# Patient Record
Sex: MALE | Race: WHITE | Employment: FULL TIME | URBAN - METROPOLITAN AREA
[De-identification: names, ages, dates, MRNs, and addresses within clinical notes are randomized per-mention and may not be internally consistent; named-entity substitution may affect disease eponyms.]

---

## 2017-03-27 ENCOUNTER — OFFICE VISIT (OUTPATIENT)
Dept: INTERNAL MEDICINE CLINIC | Age: 27
End: 2017-03-27

## 2017-03-27 VITALS
WEIGHT: 171.4 LBS | HEIGHT: 66 IN | BODY MASS INDEX: 27.55 KG/M2 | SYSTOLIC BLOOD PRESSURE: 112 MMHG | OXYGEN SATURATION: 98 % | HEART RATE: 75 BPM | DIASTOLIC BLOOD PRESSURE: 72 MMHG

## 2017-03-27 DIAGNOSIS — K62.5 RECTAL BLEEDING: Primary | ICD-10-CM

## 2017-03-27 DIAGNOSIS — R10.84 GENERALIZED ABDOMINAL PAIN: ICD-10-CM

## 2017-03-27 DIAGNOSIS — Z87.448 HISTORY OF URETHRAL STRICTURE: ICD-10-CM

## 2017-03-27 DIAGNOSIS — Z80.0 FAMILY HISTORY OF COLON CANCER IN MOTHER: ICD-10-CM

## 2017-03-27 LAB
A/G RATIO: 1.4 (ref 1.1–2.2)
ALBUMIN SERPL-MCNC: 4.7 G/DL (ref 3.4–5)
ALP BLD-CCNC: 112 U/L (ref 40–129)
ALT SERPL-CCNC: 12 U/L (ref 10–40)
ANION GAP SERPL CALCULATED.3IONS-SCNC: 19 MMOL/L (ref 3–16)
AST SERPL-CCNC: 22 U/L (ref 15–37)
BASOPHILS ABSOLUTE: 0 K/UL (ref 0–0.2)
BASOPHILS RELATIVE PERCENT: 0.4 %
BILIRUB SERPL-MCNC: 0.4 MG/DL (ref 0–1)
BUN BLDV-MCNC: 12 MG/DL (ref 7–20)
CALCIUM SERPL-MCNC: 9.7 MG/DL (ref 8.3–10.6)
CHLORIDE BLD-SCNC: 101 MMOL/L (ref 99–110)
CO2: 27 MMOL/L (ref 21–32)
CREAT SERPL-MCNC: 0.9 MG/DL (ref 0.9–1.3)
EOSINOPHILS ABSOLUTE: 0.1 K/UL (ref 0–0.6)
EOSINOPHILS RELATIVE PERCENT: 1.6 %
GFR AFRICAN AMERICAN: >60
GFR NON-AFRICAN AMERICAN: >60
GLOBULIN: 3.3 G/DL
GLUCOSE BLD-MCNC: 85 MG/DL (ref 70–99)
HCT VFR BLD CALC: 49.3 % (ref 40.5–52.5)
HEMOCCULT STL QL: ABNORMAL
HEMOCCULT STL QL: ABNORMAL
HEMOCCULT STL QL: POSITIVE
HEMOGLOBIN: 16.5 G/DL (ref 13.5–17.5)
LYMPHOCYTES ABSOLUTE: 1.9 K/UL (ref 1–5.1)
LYMPHOCYTES RELATIVE PERCENT: 24.8 %
MCH RBC QN AUTO: 28.6 PG (ref 26–34)
MCHC RBC AUTO-ENTMCNC: 33.4 G/DL (ref 31–36)
MCV RBC AUTO: 85.6 FL (ref 80–100)
MONOCYTES ABSOLUTE: 0.8 K/UL (ref 0–1.3)
MONOCYTES RELATIVE PERCENT: 10.3 %
NEUTROPHILS ABSOLUTE: 4.9 K/UL (ref 1.7–7.7)
NEUTROPHILS RELATIVE PERCENT: 62.9 %
PDW BLD-RTO: 13.8 % (ref 12.4–15.4)
PLATELET # BLD: 228 K/UL (ref 135–450)
PMV BLD AUTO: 8.5 FL (ref 5–10.5)
POTASSIUM SERPL-SCNC: 4.2 MMOL/L (ref 3.5–5.1)
RBC # BLD: 5.76 M/UL (ref 4.2–5.9)
SODIUM BLD-SCNC: 147 MMOL/L (ref 136–145)
TOTAL PROTEIN: 8 G/DL (ref 6.4–8.2)
TSH SERPL DL<=0.05 MIU/L-ACNC: 2.75 UIU/ML (ref 0.27–4.2)
WBC # BLD: 7.7 K/UL (ref 4–11)

## 2017-03-27 PROCEDURE — 82270 OCCULT BLOOD FECES: CPT | Performed by: INTERNAL MEDICINE

## 2017-03-27 PROCEDURE — 99204 OFFICE O/P NEW MOD 45 MIN: CPT | Performed by: INTERNAL MEDICINE

## 2017-03-27 RX ORDER — PERMETHRIN 50 MG/G
CREAM TOPICAL
Refills: 0 | COMMUNITY
Start: 2017-02-03 | End: 2017-06-03 | Stop reason: ALTCHOICE

## 2017-03-27 ASSESSMENT — ENCOUNTER SYMPTOMS
SHORTNESS OF BREATH: 0
EYE REDNESS: 0
RESPIRATORY NEGATIVE: 1
EYES NEGATIVE: 1
HEARTBURN: 1
SORE THROAT: 0
BACK PAIN: 0
VOMITING: 0
CONSTIPATION: 0
EYE DISCHARGE: 0
SPUTUM PRODUCTION: 0
COUGH: 0
ORTHOPNEA: 0
EYE PAIN: 0
NAUSEA: 0
WHEEZING: 0
DOUBLE VISION: 0
STRIDOR: 0
ABDOMINAL PAIN: 1
DIARRHEA: 0
BLOOD IN STOOL: 1
BLURRED VISION: 0
PHOTOPHOBIA: 0
HEMOPTYSIS: 0

## 2017-03-28 ENCOUNTER — TELEPHONE (OUTPATIENT)
Dept: INTERNAL MEDICINE CLINIC | Age: 27
End: 2017-03-28

## 2017-03-28 LAB
ESTIMATED AVERAGE GLUCOSE: 116.9 MG/DL
HBA1C MFR BLD: 5.7 %

## 2017-04-03 ENCOUNTER — EMPLOYEE WELLNESS (OUTPATIENT)
Dept: OTHER | Age: 27
End: 2017-04-03

## 2017-04-03 LAB
CHOLESTEROL, TOTAL: 139 MG/DL (ref 0–199)
GLUCOSE BLD-MCNC: 76 MG/DL (ref 70–99)
HDLC SERPL-MCNC: 32 MG/DL (ref 40–60)
LDL CHOLESTEROL CALCULATED: 92 MG/DL
TRIGL SERPL-MCNC: 74 MG/DL (ref 0–150)

## 2017-04-04 ENCOUNTER — HOSPITAL ENCOUNTER (OUTPATIENT)
Dept: CT IMAGING | Age: 27
Discharge: HOME | End: 2017-04-04
Attending: INTERNAL MEDICINE | Admitting: INTERNAL MEDICINE

## 2017-04-04 DIAGNOSIS — K62.5 RECTAL BLEEDING: ICD-10-CM

## 2017-04-04 DIAGNOSIS — K62.5 HEMORRHAGE OF ANUS AND RECTUM: ICD-10-CM

## 2017-04-06 ENCOUNTER — TELEPHONE (OUTPATIENT)
Dept: INTERNAL MEDICINE CLINIC | Age: 27
End: 2017-04-06

## 2017-06-14 ENCOUNTER — OFFICE VISIT (OUTPATIENT)
Dept: INTERNAL MEDICINE CLINIC | Age: 27
End: 2017-06-14

## 2017-06-14 VITALS
OXYGEN SATURATION: 97 % | HEIGHT: 66 IN | BODY MASS INDEX: 28.54 KG/M2 | SYSTOLIC BLOOD PRESSURE: 114 MMHG | HEART RATE: 77 BPM | DIASTOLIC BLOOD PRESSURE: 82 MMHG | TEMPERATURE: 97.9 F | WEIGHT: 177.6 LBS

## 2017-06-14 DIAGNOSIS — R51.9 CHRONIC NONINTRACTABLE HEADACHE, UNSPECIFIED HEADACHE TYPE: Primary | ICD-10-CM

## 2017-06-14 DIAGNOSIS — G89.29 CHRONIC NONINTRACTABLE HEADACHE, UNSPECIFIED HEADACHE TYPE: Primary | ICD-10-CM

## 2017-06-14 PROCEDURE — 99213 OFFICE O/P EST LOW 20 MIN: CPT | Performed by: INTERNAL MEDICINE

## 2017-06-14 ASSESSMENT — ENCOUNTER SYMPTOMS
NAUSEA: 0
SORE THROAT: 0
COUGH: 0
EYE REDNESS: 0
EYE DISCHARGE: 0
SPUTUM PRODUCTION: 0
WHEEZING: 0
EYE PAIN: 0
RESPIRATORY NEGATIVE: 1
EYES NEGATIVE: 1
DOUBLE VISION: 0
DIARRHEA: 0
HEMOPTYSIS: 0
VOMITING: 0
ORTHOPNEA: 0
BACK PAIN: 0
PHOTOPHOBIA: 0
HEARTBURN: 1
SHORTNESS OF BREATH: 0
STRIDOR: 0
ABDOMINAL PAIN: 1
CONSTIPATION: 0
BLURRED VISION: 0
BLOOD IN STOOL: 1

## 2017-07-26 ENCOUNTER — TELEPHONE (OUTPATIENT)
Dept: INTERNAL MEDICINE CLINIC | Age: 27
End: 2017-07-26

## 2017-07-27 ENCOUNTER — OFFICE VISIT (OUTPATIENT)
Dept: INTERNAL MEDICINE CLINIC | Age: 27
End: 2017-07-27

## 2017-07-27 VITALS
OXYGEN SATURATION: 98 % | HEART RATE: 74 BPM | SYSTOLIC BLOOD PRESSURE: 124 MMHG | HEIGHT: 66 IN | WEIGHT: 181.4 LBS | DIASTOLIC BLOOD PRESSURE: 82 MMHG | BODY MASS INDEX: 29.15 KG/M2

## 2017-07-27 DIAGNOSIS — A64 SEXUALLY TRANSMITTED DISEASE (STD): ICD-10-CM

## 2017-07-27 DIAGNOSIS — R30.0 DYSURIA: ICD-10-CM

## 2017-07-27 DIAGNOSIS — R30.0 DYSURIA: Primary | ICD-10-CM

## 2017-07-27 DIAGNOSIS — Z87.448 HISTORY OF URETHRAL STRICTURE: ICD-10-CM

## 2017-07-27 LAB
BILIRUBIN URINE: NEGATIVE
BLOOD, URINE: NEGATIVE
CLARITY: CLEAR
COLOR: YELLOW
EPITHELIAL CELLS, UA: 0 /HPF (ref 0–5)
GLUCOSE URINE: NEGATIVE MG/DL
HYALINE CASTS: 0 /LPF (ref 0–8)
KETONES, URINE: NEGATIVE MG/DL
LEUKOCYTE ESTERASE, URINE: ABNORMAL
MICROSCOPIC EXAMINATION: YES
NITRITE, URINE: NEGATIVE
PH UA: 6
PROTEIN UA: NEGATIVE MG/DL
RBC UA: 3 /HPF (ref 0–4)
SPECIFIC GRAVITY UA: 1.01
URINE TYPE: ABNORMAL
UROBILINOGEN, URINE: 0.2 E.U./DL
WBC UA: 3 /HPF (ref 0–5)

## 2017-07-27 PROCEDURE — 96372 THER/PROPH/DIAG INJ SC/IM: CPT | Performed by: INTERNAL MEDICINE

## 2017-07-27 PROCEDURE — 99213 OFFICE O/P EST LOW 20 MIN: CPT | Performed by: INTERNAL MEDICINE

## 2017-07-27 RX ORDER — AZITHROMYCIN 500 MG/1
1000 TABLET, FILM COATED ORAL ONCE
Qty: 2 TABLET | Refills: 0 | Status: SHIPPED | OUTPATIENT
Start: 2017-07-27 | End: 2017-07-27

## 2017-07-27 RX ORDER — CEFTRIAXONE SODIUM 250 MG/1
250 INJECTION, POWDER, FOR SOLUTION INTRAMUSCULAR; INTRAVENOUS ONCE
Status: COMPLETED | OUTPATIENT
Start: 2017-07-27 | End: 2017-07-27

## 2017-07-27 RX ADMIN — CEFTRIAXONE SODIUM 500 MG: 250 INJECTION, POWDER, FOR SOLUTION INTRAMUSCULAR; INTRAVENOUS at 17:56

## 2017-07-27 ASSESSMENT — ENCOUNTER SYMPTOMS
BLOOD IN STOOL: 1
ORTHOPNEA: 0
VOMITING: 0
EYE PAIN: 0
BLURRED VISION: 0
PHOTOPHOBIA: 0
EYES NEGATIVE: 1
SORE THROAT: 0
DIARRHEA: 0
STRIDOR: 0
DOUBLE VISION: 0
ABDOMINAL PAIN: 0
HEMOPTYSIS: 0
WHEEZING: 0
RESPIRATORY NEGATIVE: 1
HEARTBURN: 0
BACK PAIN: 0
NAUSEA: 0
EYE DISCHARGE: 0
EYE REDNESS: 0
CONSTIPATION: 0
SHORTNESS OF BREATH: 0
COUGH: 0
SPUTUM PRODUCTION: 0

## 2017-07-28 LAB
C. TRACHOMATIS DNA ,URINE: NEGATIVE
N. GONORRHOEAE DNA, URINE: POSITIVE

## 2017-07-29 LAB — URINE CULTURE, ROUTINE: NORMAL

## 2017-09-19 ENCOUNTER — SURG/PROC ORDERS (OUTPATIENT)
Dept: ANESTHESIOLOGY | Age: 27
End: 2017-09-19

## 2017-09-19 RX ORDER — SODIUM CHLORIDE 0.9 % (FLUSH) 0.9 %
10 SYRINGE (ML) INJECTION EVERY 12 HOURS SCHEDULED
Status: CANCELLED | OUTPATIENT
Start: 2017-09-19

## 2017-09-19 RX ORDER — SODIUM CHLORIDE 9 MG/ML
INJECTION, SOLUTION INTRAVENOUS CONTINUOUS
Status: CANCELLED | OUTPATIENT
Start: 2017-09-19

## 2017-09-19 RX ORDER — SODIUM CHLORIDE 0.9 % (FLUSH) 0.9 %
10 SYRINGE (ML) INJECTION PRN
Status: CANCELLED | OUTPATIENT
Start: 2017-09-19

## 2017-09-21 ENCOUNTER — HOSPITAL ENCOUNTER (OUTPATIENT)
Dept: ENDOSCOPY | Age: 27
Discharge: HOME | End: 2017-09-21
Attending: INTERNAL MEDICINE | Admitting: INTERNAL MEDICINE

## 2017-09-21 VITALS
HEART RATE: 70 BPM | DIASTOLIC BLOOD PRESSURE: 79 MMHG | SYSTOLIC BLOOD PRESSURE: 122 MMHG | RESPIRATION RATE: 16 BRPM | OXYGEN SATURATION: 98 % | BODY MASS INDEX: 28.61 KG/M2 | TEMPERATURE: 97.4 F | WEIGHT: 178 LBS | HEIGHT: 66 IN

## 2017-09-21 RX ORDER — SODIUM CHLORIDE 9 MG/ML
INJECTION, SOLUTION INTRAVENOUS CONTINUOUS
Status: DISCONTINUED | OUTPATIENT
Start: 2017-09-21 | End: 2017-09-22 | Stop reason: HOSPADM

## 2017-09-21 RX ORDER — SODIUM CHLORIDE 0.9 % (FLUSH) 0.9 %
10 SYRINGE (ML) INJECTION PRN
Status: DISCONTINUED | OUTPATIENT
Start: 2017-09-21 | End: 2017-09-22 | Stop reason: HOSPADM

## 2017-09-21 RX ORDER — SODIUM CHLORIDE 0.9 % (FLUSH) 0.9 %
10 SYRINGE (ML) INJECTION EVERY 12 HOURS SCHEDULED
Status: DISCONTINUED | OUTPATIENT
Start: 2017-09-21 | End: 2017-09-22 | Stop reason: HOSPADM

## 2017-09-21 RX ADMIN — SODIUM CHLORIDE: 9 INJECTION, SOLUTION INTRAVENOUS at 08:33

## 2017-09-21 ASSESSMENT — PAIN SCALES - GENERAL
PAINLEVEL_OUTOF10: 0

## 2017-09-21 ASSESSMENT — PAIN - FUNCTIONAL ASSESSMENT: PAIN_FUNCTIONAL_ASSESSMENT: 0-10

## 2017-10-19 ENCOUNTER — OFFICE VISIT (OUTPATIENT)
Dept: INTERNAL MEDICINE CLINIC | Age: 27
End: 2017-10-19

## 2017-10-19 VITALS
BODY MASS INDEX: 29.83 KG/M2 | TEMPERATURE: 98.6 F | SYSTOLIC BLOOD PRESSURE: 134 MMHG | RESPIRATION RATE: 16 BRPM | OXYGEN SATURATION: 98 % | HEART RATE: 76 BPM | WEIGHT: 184.8 LBS | DIASTOLIC BLOOD PRESSURE: 78 MMHG

## 2017-10-19 DIAGNOSIS — Z23 NEED FOR DIPHTHERIA-TETANUS-PERTUSSIS (TDAP) VACCINE: ICD-10-CM

## 2017-10-19 DIAGNOSIS — M23.8X1 KNEE JOINT LAXITY, RIGHT: Primary | ICD-10-CM

## 2017-10-19 DIAGNOSIS — R29.6 FALLS FREQUENTLY: ICD-10-CM

## 2017-10-19 DIAGNOSIS — M25.561 ACUTE PAIN OF RIGHT KNEE: ICD-10-CM

## 2017-10-19 PROCEDURE — 90471 IMMUNIZATION ADMIN: CPT | Performed by: INTERNAL MEDICINE

## 2017-10-19 PROCEDURE — 90715 TDAP VACCINE 7 YRS/> IM: CPT | Performed by: INTERNAL MEDICINE

## 2017-10-19 PROCEDURE — 99214 OFFICE O/P EST MOD 30 MIN: CPT | Performed by: INTERNAL MEDICINE

## 2017-10-19 ASSESSMENT — ENCOUNTER SYMPTOMS
EYES NEGATIVE: 1
RESPIRATORY NEGATIVE: 1
GASTROINTESTINAL NEGATIVE: 1

## 2017-10-19 ASSESSMENT — PATIENT HEALTH QUESTIONNAIRE - PHQ9
2. FEELING DOWN, DEPRESSED OR HOPELESS: 0
1. LITTLE INTEREST OR PLEASURE IN DOING THINGS: 0
SUM OF ALL RESPONSES TO PHQ9 QUESTIONS 1 & 2: 0
SUM OF ALL RESPONSES TO PHQ QUESTIONS 1-9: 0

## 2017-10-19 NOTE — PROGRESS NOTES
lethargic, to not be writhing in pain, not malnourished, not dehydrated and not jaundiced. He appears healthy. He appears not cachectic. Non-toxic appearance. He does not have a sickly appearance. No distress. Eyes: Right eye visual fields normal and left eye visual fields normal. Conjunctivae, EOM and lids are normal. Pupils are equal, round, and reactive to light. No scleral icterus. Neck: Normal range of motion. Neck supple. No JVD present. No tracheal deviation present. No thyromegaly present. Cardiovascular: Normal rate, regular rhythm, normal heart sounds and intact distal pulses. Exam reveals no gallop and no friction rub. No murmur heard. Pulmonary/Chest: Effort normal and breath sounds normal. No stridor. No respiratory distress. He has no wheezes. He has no rales. He exhibits no tenderness. Musculoskeletal:        Right knee: He exhibits decreased range of motion, abnormal meniscus and MCL laxity. He exhibits no swelling, no effusion, no ecchymosis, no deformity, no laceration, no erythema and normal alignment. Left knee: Normal.   Right ACL laxity   Lymphadenopathy:     He has no cervical adenopathy. Neurological: He is oriented to person, place, and time. He appears not lethargic. Skin: He is not diaphoretic. Assessment/Plan:      Encounter Diagnoses   Name Primary?  Knee joint laxity, right Yes    Acute pain of right knee     Falls frequently     Need for diphtheria-tetanus-pertussis (Tdap) vaccine        1. Knee joint laxity, right  Possibly with an ACL tear from work or volleyball with weakness, laxity, falls  - MRI Knee Right W JT WO Contrast; Future    2. Acute pain of right knee  Ok for NSAIDs for now  - MRI Knee Right W JT WO Contrast; Future    3. Falls frequently  Likely has a connective tissue tear in his right knee, will wear support sleeve and get MRI  - MRI Knee Right W JT WO Contrast; Future    4.  Need for diphtheria-tetanus-pertussis (Tdap) vaccine  Today  - Tdap (age 10y-63y) IM (ADACEL)        Additional Orders:      Orders Placed This Encounter   Procedures    MRI Knee Right W JT WO Contrast     Standing Status:   Future     Standing Expiration Date:   10/19/2018     Order Specific Question:   Reason for exam:     Answer:   Laxity, falls frequently, concern for ACL tear    Tdap (age 10y-63y) IM (ADACEL)     No orders of the defined types were placed in this encounter. DISPOSITION:      Return in about 6 weeks (around 11/30/2017), or if symptoms worsen or fail to improve.   1. Greater than 25 minutes spent with patient and >20 minutes on medication dosing, use and lifestyle modifications, home safety    Luz Marina Rodriguez MD

## 2017-10-19 NOTE — PATIENT INSTRUCTIONS
Patient Education        Anterior Cruciate Ligament (ACL) Tear: Care Instructions  Your Care Instructions    The anterior cruciate ligament (ACL) is one of four knee ligaments that connect the upper leg bone with the lower leg bones. The ACL keeps your knee stable when your leg moves forward and backward. It also keeps the knee from bending sideways. A torn ACL often occurs during sports that require stop-and-go or twisting motions, such as soccer, football, basketball, and snow skiing. Treatment depends on how badly the ACL is torn. Not all injuries need surgery. Your doctor also will base your treatment on how unstable the knee is, whether other parts of your knee are injured, and how active you are. You may be able to wait for a while before deciding whether to have surgery. Follow-up care is a key part of your treatment and safety. Be sure to make and go to all appointments, and call your doctor if you are having problems. It's also a good idea to know your test results and keep a list of the medicines you take. How can you care for yourself at home? · Follow your doctor's directions for wearing a brace or an immobilizer, which limits movement of your knee. Wrapping your knee with an elastic bandage may help reduce or prevent swelling. · Use crutches as directed in the first few days after your injury if your doctor recommends them. · Rest your knee when possible. But try to flex your calf muscles often to help blood circulate in your legs. · Put ice or a cold pack on your knee for 10 to 20 minutes at a time. Try to do this every 1 to 2 hours during the next 3 days (when you are awake) or until the swelling goes down. Put a thin cloth between the ice and your skin. · Prop up your leg on a pillow when you ice it or anytime you sit or lie down during the next 3 days. Try to keep it above the level of your heart. This will help reduce swelling. · Take pain medicines exactly as directed.   ¨ If the doctor gave you a prescription medicine for pain, take it as prescribed. ¨ If you are not taking a prescription pain medicine, take an over-the-counter medicine to reduce pain and swelling. These include ibuprofen (Advil, Motrin) and naproxen (Aleve). Read and follow all instructions on the label. · Follow your doctor's or physical therapist's directions for strength exercises. Exercises to make your thigh muscles stronger and increase knee motion can help you get ready for a physical rehabilitation (rehab) program or surgery with a rehab program. Here are a few exercises you can do if your doctor says it is okay. ¨ Quad sets: Lie down on the floor or the bed with your injured leg straight. Fully extend your legthere should be no or little bend in your knee. Tighten the thigh (quadriceps) of your injured leg for 10 seconds. Do not lift your heel up. Relax your quadriceps for 10 seconds. Repeat 8 to 12 times several times during the day. ¨ Straight-leg raises: Lie down on the floor or the bed with your injured leg flat and your uninjured leg bent so that the bottom of your foot is on the floor or bed. Tighten the quadriceps of your injured leg. Keeping your knee as straight as possible, lift your injured leg off the bed until it is about 18 inches above the bed or floor. Lower your leg back down and relax for 5 seconds. Do 3 sets of 8 to 12 repetitions. ¨ Heel slides: Lie down on the floor or the bed with your leg flat. Slowly begin to slide your heel toward your rear end (buttocks), keeping your heel on the floor. Your knee will begin to bend. Slide your heel and bend your knee until it becomes a little sore and you can feel a small amount of pressure inside your knee. Hold this position for 15 to 30 seconds. Slide your heel back down until your leg is straight on the floor. Relax for 10 seconds. Repeat 2 to 4 times several times during the day.   ¨ Side-lying leg lifts: Lie on your side with your legs straight and the injured leg on top. Keeping your leg straight, raise your injured leg up and backward about 6 inches. Lower the leg to the starting position. Do 3 sets of 20 repetitions, or if you tire quickly, 3 sets of 8 to 12 repetitions. When should you call for help? Call 911 anytime you think you may need emergency care. For example, call if:  · You have sudden chest pain and shortness of breath, and you cough up blood. Call your doctor now or seek immediate medical care if:  · You have signs of a blood clot, such as:  ¨ Pain in your calf, back of the knee, thigh, or groin. ¨ Redness and swelling in your leg or groin. · You have increasing or severe knee pain. Watch closely for changes in your health, and be sure to contact your doctor if:  · Your knee begins to lock up or give out. · You are having more trouble putting weight on your knee. · You do not get better as expected. Where can you learn more? Go to [email or web address]://[email or web address] [email or web address].[email or web address]. org and sign in to your [employer] account. Tracy Blancas in the [facility] box to learn more about \"Anterior Cruciate Ligament (ACL) Tear: Care Instructions. \"     If you do not have an account, please click on the \"Sign Up Now\" link. Current as of: March 21, 2017  Content Version: 11.3  © 2206-1980 [employer]. Care instructions adapted under license by [facility] ([facility]). If you have questions about a medical condition or this instruction, always ask your healthcare professional. [place] [address] any warranty or liability for your use of this information. Patient Education        Anterior Cruciate Ligament (ACL): Rehab Exercises  Your Care Instructions  Here are some examples of exercises for your ACL. Start each exercise slowly. Ease off the exercise if you start to have pain. Your doctor or physical therapist will tell you when you can start these exercises and which ones will work best for you.   How to do the exercises  Quad off the floor until your shoulders, hips, and knees are all in a straight line. 3. Hold for about 6 seconds as you continue to breathe normally, and then slowly lower your hips back down to the floor and rest for up to 10 seconds. 4. Do 8 to 12 repetitions. Hamstring curls    1. Lie on your stomach with your knees straight. If your kneecap is uncomfortable, roll up a washcloth and put it under your leg just above your kneecap. 2. Lift the foot of your injured leg by bending the knee so that you bring the foot up toward your buttock. If this motion hurts, try it without bending your knee quite as far. This may help you avoid any painful motion. 3. Slowly lower your leg back to the floor. 4. Do 8 to 12 repetitions. 5. With permission from your doctor or physical therapist, you may also want to add a cuff weight to your ankle (not more than 5 pounds) or use soup cans in a plastic bag with the loops around your ankle. With weight, you do not have to lift your leg more than 12 inches to get a hamstring workout. Shallow standing knee bends    Note: Do this exercise only if you have very little pain; if you have no clicking, locking, or giving way in the injured knee; and if it does not hurt while you are doing 8 to 12 repetitions. 1. Stand with your hands lightly resting on a counter or chair in front of you. Put your feet shoulder-width apart. 2. Slowly bend your knees so that you squat down like you are going to sit in a chair. Make sure that your knees do not go in front of your toes. 3. Lower yourself about 6 inches. Your heels should remain on the floor at all times. 4. Rise slowly to a standing position. Heel raises    1. Stand with your feet a few inches apart, with your hands lightly resting on a counter or chair in front of you. 2. Slowly raise your heels off the floor while keeping your knees straight. 3. Hold for about 6 seconds, then slowly lower your heels to the floor.   4. Do 8 to 12

## 2017-11-01 ENCOUNTER — OFFICE VISIT (OUTPATIENT)
Dept: SURGERY | Age: 27
End: 2017-11-01

## 2017-11-01 ENCOUNTER — HOSPITAL ENCOUNTER (OUTPATIENT)
Dept: MRI IMAGING | Age: 27
Discharge: HOME | End: 2017-11-01
Attending: INTERNAL MEDICINE | Admitting: INTERNAL MEDICINE

## 2017-11-01 VITALS
DIASTOLIC BLOOD PRESSURE: 76 MMHG | SYSTOLIC BLOOD PRESSURE: 110 MMHG | HEIGHT: 66 IN | BODY MASS INDEX: 29.73 KG/M2 | WEIGHT: 185 LBS

## 2017-11-01 DIAGNOSIS — M23.8X1 OTHER INTERNAL DERANGEMENTS OF RIGHT KNEE: ICD-10-CM

## 2017-11-01 DIAGNOSIS — M23.8X1 KNEE JOINT LAXITY, RIGHT: ICD-10-CM

## 2017-11-01 DIAGNOSIS — K64.9 HEMORRHOIDS, UNSPECIFIED HEMORRHOID TYPE: Primary | ICD-10-CM

## 2017-11-01 DIAGNOSIS — M25.561 ACUTE PAIN OF RIGHT KNEE: ICD-10-CM

## 2017-11-01 DIAGNOSIS — R29.6 FALLS FREQUENTLY: ICD-10-CM

## 2017-11-01 PROCEDURE — 99243 OFF/OP CNSLTJ NEW/EST LOW 30: CPT | Performed by: SURGERY

## 2017-12-12 ENCOUNTER — OFFICE VISIT (OUTPATIENT)
Dept: INTERNAL MEDICINE CLINIC | Age: 27
End: 2017-12-12

## 2017-12-12 VITALS
SYSTOLIC BLOOD PRESSURE: 98 MMHG | WEIGHT: 181.4 LBS | HEIGHT: 66 IN | BODY MASS INDEX: 29.15 KG/M2 | DIASTOLIC BLOOD PRESSURE: 70 MMHG | HEART RATE: 70 BPM | TEMPERATURE: 97.9 F | OXYGEN SATURATION: 95 %

## 2017-12-12 DIAGNOSIS — Z02.0 SCHOOL PHYSICAL EXAM: ICD-10-CM

## 2017-12-12 DIAGNOSIS — Z02.0 SCHOOL PHYSICAL EXAM: Primary | ICD-10-CM

## 2017-12-12 DIAGNOSIS — D23.9 DYSPLASTIC NEVUS OF SKIN: ICD-10-CM

## 2017-12-12 LAB
HBV SURFACE AB TITR SER: 6.65 MUL/ML
RUBELLA ANTIBODY IGG: 45.3 IU/ML

## 2017-12-12 PROCEDURE — 86580 TB INTRADERMAL TEST: CPT | Performed by: INTERNAL MEDICINE

## 2017-12-12 PROCEDURE — 99395 PREV VISIT EST AGE 18-39: CPT | Performed by: INTERNAL MEDICINE

## 2017-12-12 NOTE — PROGRESS NOTES
OUTPATIENT PROGRESS NOTE  Date of Service:  12/12/2017  Address: [address] [place] [address] INTERNAL MEDICINE  [address] [address]  Dept: [phone]    Subjective:      Chief Complaint   Patient presents with    Annual Exam      Patient ID: [id number]  Kevon Maza is a 32 y.o. male    HPIwho is here for a school physcial for masters in nursing at [employer]. He has no complaints. He works a lot and goes to school no formal exercise plan. Allergies   Allergen Reactions    Amoxicillin     Dye [Iodides]      Current Outpatient Prescriptions   Medication Sig Dispense Refill    pantoprazole (PROTONIX) 20 MG tablet Take 1 tablet by mouth daily. 30 tablet 0     No current facility-administered medications for this visit. Past Medical History:   Diagnosis Date    GERD (gastroesophageal reflux disease)     Migraine     Myoclonic disorder     pt reports last episode 6 months ago     Past Surgical History:   Procedure Laterality Date    COLONOSCOPY      TYMPANOSTOMY TUBE PLACEMENT Left 1992     Social History   Substance Use Topics    Smoking status: Never Smoker    Smokeless tobacco: Never Used    Alcohol use 3.6 oz/week     6 Cans of beer per week      Comment: occassionally     Family History   Problem Relation Age of Onset    Colon Cancer Mother 48    Diabetes Father     Depression Father           Review of Systems   Neurological:        Right knee used to give out unexplained but stopped   All other systems reviewed and are negative. Objective:   Physical Exam   Constitutional: He is well-developed, well-nourished, and in no distress. HENT:   Head: Normocephalic and atraumatic. Right Ear: External ear normal.   Left Ear: External ear normal.   Nose: Nose normal.   Mouth/Throat: Oropharynx is clear and moist.   Eyes: Conjunctivae and EOM are normal. Pupils are equal, round, and reactive to light. Right eye exhibits no discharge.  Left eye exhibits no discharge. No scleral icterus. Neck: Normal range of motion. Neck supple. No JVD present. No tracheal deviation present. No thyromegaly present. Cardiovascular: Normal rate, regular rhythm, normal heart sounds and intact distal pulses. Exam reveals no gallop and no friction rub. No murmur heard. Pulmonary/Chest: Effort normal and breath sounds normal. No stridor. No respiratory distress. He has no wheezes. He has no rales. He exhibits no tenderness. Abdominal: Soft. Bowel sounds are normal. There is no tenderness. Musculoskeletal: Normal range of motion. He exhibits no edema or tenderness. Lymphadenopathy:     He has no cervical adenopathy. Neurological: He is alert. He has normal reflexes. No cranial nerve deficit. He exhibits normal muscle tone. Gait normal.   Skin: Skin is warm and dry. No rash noted. Unusual mole with multiple dark spots and fairly large diam, smooth border on back   Psychiatric: Mood, memory, affect and judgment normal.   Nursing note and vitals reviewed. Assessment/Plan   1. School physical exam  Due for PPD and Hep B SAb  Form completesd       2.  Mole unusual : will refer for skin Nancy Yuen MD

## 2017-12-14 ENCOUNTER — NURSE ONLY (OUTPATIENT)
Dept: INTERNAL MEDICINE CLINIC | Age: 27
End: 2017-12-14

## 2017-12-14 LAB
INDURATION: 0
MUV IGG SER QL: 98.1 AU/ML
RUBEOLA (MEASLES) AB IGG: 125 AU/ML
TB SKIN TEST: NEGATIVE

## 2018-03-20 VITALS — WEIGHT: 168 LBS | BODY MASS INDEX: 27.12 KG/M2

## 2018-09-14 ENCOUNTER — OFFICE VISIT (OUTPATIENT)
Dept: INTERNAL MEDICINE CLINIC | Age: 28
End: 2018-09-14

## 2018-09-14 VITALS
BODY MASS INDEX: 31.08 KG/M2 | SYSTOLIC BLOOD PRESSURE: 114 MMHG | HEART RATE: 88 BPM | DIASTOLIC BLOOD PRESSURE: 82 MMHG | WEIGHT: 193.4 LBS | OXYGEN SATURATION: 98 % | HEIGHT: 66 IN

## 2018-09-14 DIAGNOSIS — R36.9 PENILE DISCHARGE: Primary | ICD-10-CM

## 2018-09-14 DIAGNOSIS — Z87.448 HISTORY OF URETHRAL STRICTURE: ICD-10-CM

## 2018-09-14 DIAGNOSIS — Z20.2 EXPOSURE TO STD: ICD-10-CM

## 2018-09-14 LAB
BILIRUBIN, POC: NORMAL
BLOOD URINE, POC: NORMAL
CLARITY, POC: NORMAL
COLOR, POC: YELLOW
GLUCOSE URINE, POC: NORMAL
KETONES, POC: NORMAL
LEUKOCYTE EST, POC: NORMAL
NITRITE, POC: NORMAL
PH, POC: 7
PROTEIN, POC: NORMAL
SPECIFIC GRAVITY, POC: 1.02
UROBILINOGEN, POC: 0.2

## 2018-09-14 PROCEDURE — 81002 URINALYSIS NONAUTO W/O SCOPE: CPT | Performed by: NURSE PRACTITIONER

## 2018-09-14 PROCEDURE — 96372 THER/PROPH/DIAG INJ SC/IM: CPT | Performed by: NURSE PRACTITIONER

## 2018-09-14 PROCEDURE — 99213 OFFICE O/P EST LOW 20 MIN: CPT | Performed by: NURSE PRACTITIONER

## 2018-09-14 RX ORDER — AZITHROMYCIN 500 MG/1
TABLET, FILM COATED ORAL
Qty: 2 TABLET | Refills: 0 | Status: SHIPPED | OUTPATIENT
Start: 2018-09-14 | End: 2019-01-19

## 2018-09-14 NOTE — PROGRESS NOTES
HENT:   Head: Normocephalic and atraumatic. Eyes: Pupils are equal, round, and reactive to light. Conjunctivae are normal.   Neck: Normal range of motion. Neck supple. Cardiovascular: Normal rate, regular rhythm, normal heart sounds and intact distal pulses. Exam reveals no gallop and no friction rub. No murmur heard. Pulmonary/Chest: Effort normal and breath sounds normal. No respiratory distress. He has no wheezes. He has no rales. Genitourinary: Penis normal.   Musculoskeletal: Normal range of motion. Neurological: He is alert and oriented to person, place, and time. Skin: Skin is warm and dry. No rash noted. He is not diaphoretic. Psychiatric: He has a normal mood and affect. His behavior is normal. Judgment and thought content normal.   Nursing note and vitals reviewed. Assessment/Plan     Berkley Saravia was seen today for other. Diagnoses and all orders for this visit:    Penile discharge  -     cefTRIAXone (ROCEPHIN) 250 mg in lidocaine 1 % 0.71 mL IM Injection; Inject 0.71 mLs into the muscle once  -     azithromycin (ZITHROMAX) 500 MG tablet; Take 2 tabs oral route daily x1 dose  -     C.trachomatis N.gonorrhoeae DNA, Urine; Future  -     C.trachomatis N.gonorrhoeae DNA, Urine  -     POCT Urinalysis no Micro    History of urethral stricture  -Will treat symptoms as probable STD    Exposure to STD  See above        Past, Family, and Social history reviewed with patient during this visit. RX management: see under diagnosis section    Orders Placed This Encounter   Procedures    C.trachomatis N.gonorrhoeae DNA, Urine     Standing Status:   Future     Number of Occurrences:   1     Standing Expiration Date:   9/14/2019    POCT Urinalysis no Micro       Return if symptoms worsen or fail to improve.       KALLI Sosa     9/17/2018  9:49 AM

## 2018-09-15 LAB — TOTAL SYPHILLIS IGG/IGM: NORMAL

## 2018-09-17 LAB
C. TRACHOMATIS DNA ,URINE: NEGATIVE
N. GONORRHOEAE DNA, URINE: POSITIVE

## 2018-09-17 ASSESSMENT — ENCOUNTER SYMPTOMS
DIARRHEA: 0
COUGH: 0
NAUSEA: 0
SHORTNESS OF BREATH: 0
VOMITING: 0
ABDOMINAL PAIN: 0

## 2018-11-30 ENCOUNTER — HOSPITAL ENCOUNTER (OUTPATIENT)
Dept: ULTRASOUND IMAGING | Age: 28
Discharge: HOME | End: 2018-11-30
Payer: COMMERCIAL

## 2018-11-30 DIAGNOSIS — N50.812 TESTICULAR PAIN, LEFT: ICD-10-CM

## 2018-11-30 PROCEDURE — 76870 US EXAM SCROTUM: CPT

## 2019-04-24 NOTE — PROGRESS NOTES
Jermaine Hopkins Patient Age: 52 year old  MESSAGE:   Received order from Yobani Sherwood CNP to see patient for renal cyst. Please see MRI from 4/2/19: \"9 mm complex cyst noted anteriorly at the lower pole of the left kidney.\"    Spoke to patient and scheduled him for 5/3/19 at 1120.     Next and Last Visit with Provider and Department  Last visit with this provider: Visit date not found  Last visit with this department: Visit date not found    Next visit with this provider: Visit date not found  Next visit with this department: Visit date not found    WEIGHT AND HEIGHT:   Wt Readings from Last 1 Encounters:   04/24/19 97.5 kg (215 lb)     Ht Readings from Last 1 Encounters:   03/14/19 6' (1.829 m)     BMI Readings from Last 1 Encounters:   04/24/19 29.16 kg/m²       ALLERGIES:  Penicillins  Current Outpatient Medications   Medication   • triamcinolone (ARISTOSPAN) 0.1 % lotion   • pantoprazole (PROTONIX) 40 MG tablet     No current facility-administered medications for this visit.      PHARMACY to use:          Pharmacy preference(s) on file:   [id number] - [place], [place] - [address] AT [facility]  [address] [facility] [address]  Phone: [phone] Fax: [phone]      CALL BACK INFO: DO NOT LEAVE A MESSAGE - contact patient directly  ROUTING: Patient's physician/staff        PCP: Kathryn Steiner MD         INS: Payor: [employer] - [employer] / Plan: [employer] - [employer] / Product Type: Dreyer Risk   PATIENT ADDRESS:  [address] [place] [address] [place] [address]    banding so will defer to that time. EXT/NEURO: normal gait, strength/sensation grossly intact in all extremities      Assessment:     Internal hemorrhoids with bleeding     Plan:     Discussed RBA of various treatment options. At this point I recommend the patient supplement the diet with some fiber and stay hydrated. Offered hemorrhoid banding which he would like to check to see if insurance will cover prior to scheduling. I went through our handout \"The Hemorrhoid Book\" with the patient and showed pictures of the various procedures. Diet and toilet habits discussed. I don't think the symptoms are severe enough to warrant excision. He will do some research and contact his insurance company and let us know.      Belén Higuera M.D.  11/1/17   10:02 AM

## 2019-05-13 ENCOUNTER — EMPLOYEE WELLNESS (OUTPATIENT)
Dept: OTHER | Age: 29
End: 2019-05-13

## 2019-05-13 LAB
CHOLESTEROL, TOTAL: 126 MG/DL (ref 0–199)
GLUCOSE BLD-MCNC: 81 MG/DL (ref 70–99)
HDLC SERPL-MCNC: 29 MG/DL (ref 40–60)
LDL CHOLESTEROL CALCULATED: 76 MG/DL
TRIGL SERPL-MCNC: 103 MG/DL (ref 0–150)

## 2019-05-28 VITALS — WEIGHT: 192 LBS | BODY MASS INDEX: 30.99 KG/M2

## 2020-02-19 ENCOUNTER — TELEPHONE (OUTPATIENT)
Dept: ORTHOPEDIC SURGERY | Age: 30
End: 2020-02-19

## 2020-02-19 ENCOUNTER — OFFICE VISIT (OUTPATIENT)
Dept: ORTHOPEDIC SURGERY | Age: 30
End: 2020-02-19
Payer: COMMERCIAL

## 2020-02-19 VITALS — HEIGHT: 66 IN | WEIGHT: 197.09 LBS | BODY MASS INDEX: 31.68 KG/M2

## 2020-02-19 PROCEDURE — 99203 OFFICE O/P NEW LOW 30 MIN: CPT | Performed by: FAMILY MEDICINE

## 2020-02-19 PROCEDURE — L4361 PNEUMA/VAC WALK BOOT PRE OTS: HCPCS | Performed by: FAMILY MEDICINE

## 2020-02-19 PROCEDURE — L3040 FT ARCH SUPRT PREMOLD LONGIT: HCPCS | Performed by: FAMILY MEDICINE

## 2020-02-19 RX ORDER — METHYLPREDNISOLONE 4 MG/1
TABLET ORAL
Qty: 21 KIT | Refills: 0 | Status: SHIPPED | OUTPATIENT
Start: 2020-02-19 | End: 2021-01-26 | Stop reason: CLARIF

## 2020-02-19 RX ORDER — ETODOLAC 400 MG/1
400 TABLET, FILM COATED ORAL 2 TIMES DAILY
Qty: 60 TABLET | Refills: 3 | Status: SHIPPED | OUTPATIENT
Start: 2020-02-19 | End: 2021-01-26 | Stop reason: CLARIF

## 2020-02-19 NOTE — PROGRESS NOTES
systems reviewed from Patient History Form dated on 2/19/2020 and available in the patient's chart under the Media tab. Vital Signs     Ht 5' 5.98\" (1.676 m)   Wt 197 lb 1.5 oz (89.4 kg)   BMI 31.83 kg/m²     Past Medical History   has a past medical history of GERD (gastroesophageal reflux disease), Migraine, and Myoclonic disorder. Past Surgical History   has a past surgical history that includes Tympanostomy tube placement (Left, 1992) and Colonoscopy. Social History     Tobacco Use    Smoking status: Never Smoker    Smokeless tobacco: Never Used   Substance Use Topics    Alcohol use: Yes     Alcohol/week: 6.0 standard drinks     Types: 6 Cans of beer per week     Comment: occassionally    Drug use: No        Current Outpatient Medications   Medication Sig Dispense Refill    methylPREDNISolone (MEDROL DOSEPACK) 4 MG tablet Take by mouth as directed. 21 kit 0    etodolac (LODINE) 400 MG tablet Take 1 tablet by mouth 2 times daily 60 tablet 3     No current facility-administered medications for this visit. General Exam:   Constitutional: Patient is adequately groomed with no evidence of malnutrition  DTRs: Deep tendon reflexes are intact  Mental Status: The patient is oriented to time, place and person. The patient's mood and affect are appropriate. Lymphatic: The lymphatic examination bilaterally reveals all areas to be without enlargement or induration. Vascular: Examination reveals no swelling or calf tenderness. Peripheral pulses are palpable and 2+. Neurological: The patient has good coordination. There is no weakness or sensory deficit. Right foot examination    Inspection: There is no high-grade deformity soft tissue swelling or palpable masses. Palpation: Clinically tender over the peroneal brevis and mildly over the base of the fifth metatarsal but there is no bony step-offs or soft tissue swelling.   No medial or lateral ankle ligamentous or additional midfoot tendonitis of right lower extremity     Pes planus of both feet        Orders Placed This Encounter   Procedures    XR FOOT RIGHT (MIN 3 VIEWS)     Standing Status:   Future     Number of Occurrences:   1     Standing Expiration Date:   2/19/2021    Ambulatory referral to Physical Therapy     Referral Priority:   Routine     Referral Type:   Eval and Treat     Referral Reason:   Specialty Services Required     Requested Specialty:   Physical Therapy     Number of Visits Requested:   1    OTS FP Pneumatic Walking Boot Tall DJO     Patient was prescribed a [place] Shawn Tall Walking Boot. The right foot will require stabilization / immobilization from this semi-rigid / rigid orthosis to improve their function. The orthosis will assist in protecting the affected area, provide functional support and facilitate healing. Patient was instructed to progress ambulation weight bearing as tolerated in the device. The patient was educated and fit by a healthcare professional with expert knowledge and specialization in brace application while under the direct supervision of the physician. Verbal and written instructions for the use of and application of this item were provided. They were instructed to contact the office immediately should the brace result in increased pain, decreased sensation, increased swelling or worsening of the condition.  Powerstep Protech Full Length Insert     Patient was prescribed Powerstep Protech Full Length Inserts. The bilateral feet will require stabilization / support from this semi-rigid / rigid orthosis to improve their function. The orthosis will assist in protecting the affected area, provide functional support and facilitate healing. The patient was educated and fit by a healthcare professional with expert knowledge and specialization in brace application while under the direct supervision of the treating physician.   Verbal and written instructions for the use of and

## 2020-02-21 ENCOUNTER — HOSPITAL ENCOUNTER (OUTPATIENT)
Dept: PHYSICAL THERAPY | Age: 30
Setting detail: THERAPIES SERIES
Discharge: HOME | End: 2020-02-21
Payer: COMMERCIAL

## 2020-02-21 PROCEDURE — 97110 THERAPEUTIC EXERCISES: CPT

## 2020-02-21 PROCEDURE — 97161 PT EVAL LOW COMPLEX 20 MIN: CPT

## 2020-02-21 NOTE — FLOWSHEET NOTE
Provided manual therapy to mobilize LE, proximal hip and/or LS spine soft tissue/joints for the purpose of modulating pain, promoting relaxation,  increasing ROM, reducing/eliminating soft tissue swelling/inflammation/restriction, improving soft tissue extensibility and allowing for proper ROM for normal function with self care, mobility, lifting and ambulation. Modalities:  CP 10'   [] GAME READY (VASO)- for significant edema, swelling, pain control. Charges:  Timed Code Treatment Minutes: 30   Total Treatment Minutes: 60     [x] EVAL (LOW) 84083 (typically 20 minutes face-to-face)  [] EVAL (MOD) 50134 (typically 30 minutes face-to-face)  [] EVAL (HIGH) 56445 (typically 45 minutes face-to-face)  [] RE-EVAL     [x] LR(24056) x  2   [] IONTO  [] NMR (62566) x     [] VASO  [] Manual (49839) x      [] Other:  [] TA x      [] Mech Traction (75679)  [] ES(attended) (85434)      [] ES (un) (42755):       GOALS:   Patient stated goal: \"no pain, more mobility\"  []? Progressing: []? Met: []? Not Met: []? Adjusted     Therapist goals for Patient:   Short Term Goals: To be achieved in: 2 weeks  1. Independent in HEP and progression per patient tolerance, in order to prevent re-injury. []? Progressing: []? Met: []? Not Met: []? Adjusted  2. Patient will have a decrease in pain to facilitate improvement in movement, function, and ADLs as indicated by Functional Deficits. []? Progressing: []? Met: []? Not Met: []? Adjusted     Long Term Goals: To be achieved in:8 weeks  1. Disability index score of 19% or less for the LEFS to assist with reaching prior level of function. []? Progressing: []? Met: []? Not Met: []? Adjusted  2. Patient will demonstrate increased AROM to 10 deg DF, 20 deg eversion to allow for proper joint functioning as indicated by patients Functional Deficits. []? Progressing: []? Met: []? Not Met: []? Adjusted  3.  Patient will demonstrate an increase in Strength to grossly 4+/5 at R ankle in LE to

## 2020-02-21 NOTE — PLAN OF CARE
Scale: 0-8/10  Easing factors: ibuprofen, elevation,   Provocative factors: walking, descending stairs, standing in one position, positional changes,      Type: []Constant   [x]Intermittent  []Radiating []Localized []other:     Numbness/Tingling: denies    Occupation/School: Nurse at [employer] Status/Prior Level of Function: Independent with ADLs, ; 2 stairs to enter condo, 10 steps inside    OBJECTIVE:     ROM LEFT RIGHT   HIP Flex     HIP Abd     HIP Ext     HIP IR     HIP ER     Knee ext     Knee Flex     Ankle PF  50 deg   Ankle DF  2 deg   Ankle In  40   Ankle Ev  15   Strength  LEFT RIGHT   HIP Flexors     HIP Abductors     HIP Ext     Hip ER     Knee EXT (quad)     Knee Flex (HS)     Ankle DF  4+/5   Ankle PF  4-/5   Ankle Inv  4/5   Ankle EV  4/5        Circumference  Mid apex  7 cm prox             Reflexes/Sensation:    [x]Dermatomes/Myotomes intact    []Reflexes equal and normal bilaterally   []Other:    Joint mobility:    [x]Normal    []Hypo   []Hyper    Palpation: tight lateral gastroc    Functional Mobility/Transfers: no major deficits    Posture: WFL     Bandages/Dressings/Incisions: none    Gait: (include devices/WB status) ambulates w/ high tide boot    Orthopedic Special Tests: none                       [x] Patient history, allergies, meds reviewed. Medical chart reviewed. See intake form. Review Of Systems (ROS):  [x]Performed Review of systems (Integumentary, CardioPulmonary, Neurological) by intake and observation. Intake form has been scanned into medical record. Patient has been instructed to contact their primary care physician regarding ROS issues if not already being addressed at this time.       Co-morbidities/Complexities (which will affect course of rehabilitation):   [x]None           Arthritic conditions   []Rheumatoid arthritis (M05.9)  []Osteoarthritis (M19.91)   Cardiovascular conditions   []Hypertension (I10)  []Hyperlipidemia (E78.5)  []Angina pectoris (I20)  []Atherosclerosis (I70)   Musculoskeletal conditions   []Disc pathology   []Congenital spine pathologies   []Prior surgical intervention  []Osteoporosis (M81.8)  []Osteopenia (M85.8)   Endocrine conditions   []Hypothyroid (E03.9)  []Hyperthyroid Gastrointestinal conditions   []Constipation (V39.99)   Metabolic conditions   []Morbid obesity (E66.01)  []Diabetes type 1(E10.65) or 2 (E11.65)   []Neuropathy (G60.9)     Pulmonary conditions   []Asthma (J45)  []Coughing   []COPD (J44.9)   Psychological Disorders  []Anxiety (F41.9)  []Depression (F32.9)   []Other:   []Other:          Barriers to/and or personal factors that will affect rehab potential:              []Age  []Sex              []Motivation/Lack of Motivation                        [x]Co-Morbidities              []Cognitive Function, education/learning barriers              []Environmental, home barriers              []profession/work barriers  []past PT/medical experience  []other:  Justification:     Falls Risk Assessment (30 days):   [x] Falls Risk assessed and no intervention required. [] Falls Risk assessed and Patient requires intervention due to being higher risk   TUG score (>12s at risk):     [] Falls education provided, including       G-Codes:   LEFS 50/80 (38%)    ASSESSMENT: Pt presents with increased muscular tightness, impaired muscle function and decreased ROM. Would benefit from skilled PT to address these deficits to return to PLOF.     Functional Impairments:     []Noted lumbar/proximal hip/LE joint hypomobility   [x]Decreased LE functional ROM   []Decreased core/proximal hip strength and neuromuscular control   [x]Decreased LE functional strength   [x]Reduced balance/proprioceptive control   []other:      Functional Activity Limitations (from functional questionnaire and intake)   []Reduced ability to tolerate prolonged functional positions   [x]Reduced ability or difficulty with changes of positions or transfers between positions   []Reduced ability to maintain good posture and demonstrate good body mechanics with sitting, bending, and lifting   []Reduced ability to sleep   [] Reduced ability or tolerance with driving and/or computer work   []Reduced ability to perform lifting, carrying tasks   []Reduced ability to squat   []Reduced ability to forward bend   [x]Reduced ability to ambulate prolonged functional periods/distances/surfaces   [x]Reduced ability to ascend/descend stairs   [x]Reduced ability to run, hop, cut or jump   []other:    Participation Restrictions   []Reduced participation in self care activities   [x]Reduced participation in home management activities   [x]Reduced participation in work activities   [x]Reduced participation in social activities. [x]Reduced participation in sport/recreation activities. Classification :    []Signs/symptoms consistent with post-surgical status including decreased ROM, strength and function.    []Signs/symptoms consistent with joint sprain/strain   []Signs/symptoms consistent with patella-femoral syndrome   []Signs/symptoms consistent with knee OA/hip OA   []Signs/symptoms consistent with internal derangement of knee/Hip   []Signs/symptoms consistent with functional hip weakness/NMR control      [x]Signs/symptoms consistent with tendinitis/tendinosis    []signs/symptoms consistent with pathology which may benefit from Dry needling      []other:      Prognosis/Rehab Potential:      []Excellent   [x]Good    []Fair   []Poor    Tolerance of evaluation/treatment:    []Excellent   [x]Good    []Fair   []Poor  Physical Therapy Evaluation Complexity Justification  [x] A history of present problem with:  [x] no personal factors and/or comorbidities that impact the plan of care;  []1-2 personal factors and/or comorbidities that impact the plan of care  []3 personal factors and/or comorbidities that impact the plan of care  [x] An examination of body systems using standardized tests and measures addressing any of the following: body structures and functions (impairments), activity limitations, and/or participation restrictions;:  [x] a total of 1-2 or more elements   [] a total of 3 or more elements   [] a total of 4 or more elements   [x] A clinical presentation with:  [] stable and/or uncomplicated characteristics   [x] evolving clinical presentation with changing characteristics  [] unstable and unpredictable characteristics;   [x] Clinical decision making of [x] low, [] moderate, [] high complexity using standardized patient assessment instrument and/or measurable assessment of functional outcome. [x] EVAL (LOW) 90235 (typically 20 minutes face-to-face)  [] EVAL (MOD) 55379 (typically 30 minutes face-to-face)  [] EVAL (HIGH) 56437 (typically 45 minutes face-to-face)  [] RE-EVAL       PLAN:   Frequency/Duration:  1-2 days per week for 6-8 Weeks:  Interventions:  [x]  Therapeutic exercise including: strength training, ROM, for Lower extremity and core   [x]  NMR activation and proprioception for LE, Glutes and Core   [x]  Manual therapy as indicated for LE, Hip and spine to include: Dry Needling/IASTM, STM, PROM, Gr I-IV mobilizations, manipulation. [x] Modalities as needed that may include: thermal agents, E-stim, Biofeedback, US, iontophoresis as indicated  [x] Patient education on joint protection, postural re-education, activity modification, progression of HEP. HEP instruction: (see scanned forms)    GOALS:  Patient stated goal: \"no pain, more mobility\"  [] Progressing: [] Met: [] Not Met: [] Adjusted    Therapist goals for Patient:   Short Term Goals: To be achieved in: 2 weeks  1. Independent in HEP and progression per patient tolerance, in order to prevent re-injury. [] Progressing: [] Met: [] Not Met: [] Adjusted  2. Patient will have a decrease in pain to facilitate improvement in movement, function, and ADLs as indicated by Functional Deficits.   [] Progressing: [] Met: [] Not

## 2020-02-24 ENCOUNTER — HOSPITAL ENCOUNTER (OUTPATIENT)
Dept: PHYSICAL THERAPY | Age: 30
Setting detail: THERAPIES SERIES
Discharge: HOME | End: 2020-02-24
Payer: COMMERCIAL

## 2020-02-24 PROCEDURE — 97140 MANUAL THERAPY 1/> REGIONS: CPT | Performed by: PHYSICAL THERAPIST

## 2020-02-24 PROCEDURE — 97110 THERAPEUTIC EXERCISES: CPT | Performed by: PHYSICAL THERAPIST

## 2020-02-24 NOTE — FLOWSHEET NOTE
[facility], [address]  Phone: [phone]  Fax [phone]    Physical Therapy Treatment Note/ Progress Report:           Date:  2020    Patient Name:  Ela Burton    :  1990  MRN: [id number]  Restrictions/Precautions:    Medical/Treatment Diagnosis Information:  · Diagnosis: M76.71 (ICD-10-CM) - Peroneal tendonitis of right lower extremity  · Treatment Diagnosis: R ankle pain (M25.571), R foot pain (Y57.802)  Insurance/Certification information:  PT Insurance Information: Medical [place]  Physician Information:  Referring Practitioner: Dr. Hou Emanuel  Has the plan of care been signed (Y/N):        []  Yes  [x]  No     Date of Patient follow up with Physician: 3/4/2020      Is this a Progress Report:     []  Yes  [x]  No        If Yes:  Date Range for reporting period:  Beginning  Ending    Progress report will be due (10 Rx or 30 days whichever is less): 3/16/99      Recertification will be due (POC Duration  / 90 days whichever is less): 20      Visit # Insurance Allowable Requires auth   2 [facility]    [x]no        []yes:     Functional Scale: LEFS 50/80 (34%)    Date assessed: 20     Therapy Diagnosis/Practice Pattern: D      Number of Comorbidities:  [x]0     []1-2    []3+    Latex Allergy:  [x]NO      []YES  Preferred Language for Healthcare:   [x]English       []other:      Pain level:  0/10     SUBJECTIVE:  Patient reports no pain in the ankle this date. He had slight pain after stepping on it wrong when moving quickly at work, but pain has subsided. He has not had much time to do HEP due to adopting 2 puppies.       OBJECTIVE:    Observation: Increased tightness in B gastroc   Test measurements:  NTT    RESTRICTIONS/PRECAUTIONS: boot until MD visit    Exercises/Interventions:     Therapeutic Ex (63179) Sets/sec Reps Notes/CUES   Gastroc and soleus stretch on incline 30\" 3 x ea    HS activities related to strengthening, flexibility, endurance, ROM of core, proximal hip and LE for functional self-care, mobility, lifting and ambulation/stair navigation   [] (32937)Reviewed/Progressed HEP activities related to improving balance, coordination, kinesthetic sense, posture, motor skill, proprioception of core, proximal hip and LE for self care, mobility, lifting, and ambulation/stair navigation      Manual Treatments:  PROM / STM / Oscillations-Mobs:  G-I, II, III, IV (PA's, Inf., Post.)  [x] (65635) Provided manual therapy to mobilize LE, proximal hip and/or LS spine soft tissue/joints for the purpose of modulating pain, promoting relaxation,  increasing ROM, reducing/eliminating soft tissue swelling/inflammation/restriction, improving soft tissue extensibility and allowing for proper ROM for normal function with self care, mobility, lifting and ambulation. Modalities:  CP to posterior and lateral R ankle in long sitting - x15'   [] GAME READY (VASO)- for significant edema, swelling, pain control. Charges:  Timed Code Treatment Minutes: 39'   Total Treatment Minutes: 64'     [] EVAL (LOW) 88121 (typically 20 minutes face-to-face)  [] EVAL (MOD) 68369 (typically 30 minutes face-to-face)  [] EVAL (HIGH) 65689 (typically 45 minutes face-to-face)  [] RE-EVAL     [x] WC(86920) x  2   [] IONTO  [] NMR (68620) x     [] VASO  [x] Manual (27638) x  1    [] Other:  [] TA x      [] Mech Traction (52404)  [] ES(attended) (92267)      [] ES (un) (48099):       GOALS:   Patient stated goal: \"no pain, more mobility\"  []? Progressing: []? Met: []? Not Met: []? Adjusted     Therapist goals for Patient:   Short Term Goals: To be achieved in: 2 weeks  1. Independent in HEP and progression per patient tolerance, in order to prevent re-injury. []? Progressing: []? Met: []? Not Met: []? Adjusted  2.  Patient will have a decrease in pain to facilitate improvement in movement, function, and ADLs as indicated by

## 2020-02-26 ENCOUNTER — HOSPITAL ENCOUNTER (OUTPATIENT)
Dept: PHYSICAL THERAPY | Age: 30
Setting detail: THERAPIES SERIES
Discharge: HOME | End: 2020-02-26
Payer: COMMERCIAL

## 2020-02-26 PROCEDURE — 97140 MANUAL THERAPY 1/> REGIONS: CPT

## 2020-02-26 PROCEDURE — 97110 THERAPEUTIC EXERCISES: CPT

## 2020-02-26 NOTE — FLOWSHEET NOTE
[facility], [address]  Phone: [phone]  Fax [phone]    Physical Therapy Treatment Note/ Progress Report:           Date:  2020    Patient Name:  Rupert Waldron    :  1990  MRN: [id number]  Restrictions/Precautions:    Medical/Treatment Diagnosis Information:  · Diagnosis: M76.71 (ICD-10-CM) - Peroneal tendonitis of right lower extremity  · Treatment Diagnosis: R ankle pain (M25.571), R foot pain (Q69.390)  Insurance/Certification information:  PT Insurance Information: Medical [place]  Physician Information:  Referring Practitioner: Dr. Jennifer Sommers  Has the plan of care been signed (Y/N):        []  Yes  [x]  No     Date of Patient follow up with Physician: 3/4/2020      Is this a Progress Report:     []  Yes  [x]  No        If Yes:  Date Range for reporting period:  Beginning  Ending    Progress report will be due (10 Rx or 30 days whichever is less):       Recertification will be due (POC Duration  / 90 days whichever is less): 20      Visit # Insurance Allowable Requires auth   3 [facility]    [x]no        []yes:     Functional Scale: LEFS 50/80 (34%)    Date assessed: 20     Therapy Diagnosis/Practice Pattern: D      Number of Comorbidities:  [x]0     []1-2    []3+    Latex Allergy:  [x]NO      []YES  Preferred Language for Healthcare:   [x]English       []other:      Pain level:  1/10     SUBJECTIVE:  Patient states he was really sore after the last visit around his knees and calves.      OBJECTIVE:    Observation:   Test measurements:  NTT    RESTRICTIONS/PRECAUTIONS: boot until MD visit    Exercises/Interventions:     Therapeutic Ex (75202) Sets/sec Reps Notes/CUES   Gastroc and soleus stretch strap 30\" 3 x ea HEP   GTB ankle inv, ev, PF 2 10 HEP; +GTB for home   Towel crunches 5\" 20x HEP   HS Stretch off Table R 30\" 3 Cues for form         SLR Flexion R 2 10    Bridge with TA 1 15 5\" hold   Reformer DHR and alt walking 3  2 10  10 RRR   Dynaboard PF/DF and inv/ev 20x                 Manual Intervention (79129)      STM gastroc/soleus  Post talar mobs  Calcaneal mobs  Midfoot and forefoot mobs 15'  Inc tightness medially                                 NMR re-education (57858)   CUES NEEDED                                                      Therapeutic Activity (37231)                                                Therapeutic Exercise and NMR EXR  [x] (78490) Provided verbal/tactile cueing for activities related to strengthening, flexibility, endurance, ROM for improvements in LE, proximal hip, and core control with self care, mobility, lifting, ambulation.  [] (56526) Provided verbal/tactile cueing for activities related to improving balance, coordination, kinesthetic sense, posture, motor skill, proprioception  to assist with LE, proximal hip, and core control in self care, mobility, lifting, ambulation and eccentric single leg control.      NMR and Therapeutic Activities:    [] (41858 or 75107) Provided verbal/tactile cueing for activities related to improving balance, coordination, kinesthetic sense, posture, motor skill, proprioception and motor activation to allow for proper function of core, proximal hip and LE with self care and ADLs  [] (88555) Gait Re-education- Provided training and instruction to the patient for proper LE, core and proximal hip recruitment and positioning and eccentric body weight control with ambulation re-education including up and down stairs     Home Exercise Program:    [x] (16449) Reviewed/Progressed HEP activities related to strengthening, flexibility, endurance, ROM of core, proximal hip and LE for functional self-care, mobility, lifting and ambulation/stair navigation   [] (68631)Reviewed/Progressed HEP activities related to improving balance, coordination, kinesthetic sense, posture, motor skill, proprioception of core, proximal hip and LE for self allow for proper joint functioning as indicated by patients Functional Deficits. []? Progressing: []? Met: []? Not Met: []? Adjusted  3. Patient will demonstrate an increase in Strength to grossly 4+/5 at R ankle in LE to allow for proper functional mobility as indicated by patients Functional Deficits. []? Progressing: []? Met: []? Not Met: []? Adjusted  4. Patient will return to walking and reciprocal stair negotiation w/o boot without increased symptoms or restriction. []? Progressing: []? Met: []? Not Met: []? Adjusted  5. Patient will return to recreational workouts and standing at work without increased symptoms or restriction (patient specific functional goal)    []? Progressing: []? Met: []? Not Met: []? Adjusted      Overall Progression Towards Functional goals/ Treatment Progress Update:  [] Patient is progressing as expected towards functional goals listed. [] Progression is slowed due to complexities/Impairments listed. [] Progression has been slowed due to co-morbidities. [x] Plan just implemented, too soon to assess goals progression <30days   [] Goals require adjustment due to lack of progress  [] Patient is not progressing as expected and requires additional follow up with physician  [] Other    Prognosis for POC: [x] Good [] Fair  [] Poor      Patient requires continued skilled intervention: [x] Yes  [] No    Treatment/Activity Tolerance:  [x] Patient able to complete treatment  [] Patient limited by fatigue  [] Patient limited by pain    [] Patient limited by other medical complications  [] Other:     ASSESSMENT: Patient did well with TE today, however, was backed down significantly from standing work as it gave him a fair amount of irritation after the last visit. Tolerated reformer work and more focused foot/ankle activities today. Discussed resuming standing TE once appropriate.      PLAN:   [x] Continue per plan of care [] Alter current plan (see comments above)  [] Plan of care initiated [] Hold pending MD visit [] Discharge      Electronically signed by:  Inez Kamara, PT, DPT [id number]      Note: If patient does not return for scheduled/ recommended follow up visits, this note will serve as a discharge from care along with most recent update on progress.

## 2020-03-02 ENCOUNTER — HOSPITAL ENCOUNTER (OUTPATIENT)
Dept: PHYSICAL THERAPY | Age: 30
Setting detail: THERAPIES SERIES
Discharge: HOME | End: 2020-03-02
Payer: COMMERCIAL

## 2020-03-02 PROCEDURE — 97140 MANUAL THERAPY 1/> REGIONS: CPT | Performed by: PHYSICAL THERAPIST

## 2020-03-02 PROCEDURE — 97110 THERAPEUTIC EXERCISES: CPT | Performed by: PHYSICAL THERAPIST

## 2020-03-02 NOTE — FLOWSHEET NOTE
(26360)Reviewed/Progressed HEP activities related to improving balance, coordination, kinesthetic sense, posture, motor skill, proprioception of core, proximal hip and LE for self care, mobility, lifting, and ambulation/stair navigation      Manual Treatments:  PROM / STM / Oscillations-Mobs:  G-I, II, III, IV (PA's, Inf., Post.)  [x] (24750) Provided manual therapy to mobilize LE, proximal hip and/or LS spine soft tissue/joints for the purpose of modulating pain, promoting relaxation,  increasing ROM, reducing/eliminating soft tissue swelling/inflammation/restriction, improving soft tissue extensibility and allowing for proper ROM for normal function with self care, mobility, lifting and ambulation. Modalities:  CP to posterior and lateral R ankle in long sitting - x15'   [] GAME READY (VASO)- for significant edema, swelling, pain control. Charges:  Timed Code Treatment Minutes: 40'   Total Treatment Minutes: 55     [] EVAL (LOW) 11482 (typically 20 minutes face-to-face)  [] EVAL (MOD) 29644 (typically 30 minutes face-to-face)  [] EVAL (HIGH) 39394 (typically 45 minutes face-to-face)  [] RE-EVAL     [x] GN(29356) x  2   [] IONTO  [] NMR (94281) x     [] VASO  [x] Manual (77539) x  1    [] Other:  [] TA x      [] Mech Traction (73922)  [] ES(attended) (48188)      [] ES (un) (10430):       GOALS:   Patient stated goal: \"no pain, more mobility\"  []? Progressing: []? Met: []? Not Met: []? Adjusted     Therapist goals for Patient:   Short Term Goals: To be achieved in: 2 weeks  1. Independent in HEP and progression per patient tolerance, in order to prevent re-injury. [x]? Progressing: []? Met: []? Not Met: []? Adjusted  2. Patient will have a decrease in pain to facilitate improvement in movement, function, and ADLs as indicated by Functional Deficits. [x]? Progressing: []? Met: []? Not Met: []? Adjusted     Long Term Goals: To be achieved in:8 weeks  1.  Disability index score of 19% or less for the LEFS to assist with reaching prior level of function. []? Progressing: []? Met: []? Not Met: []? Adjusted  2. Patient will demonstrate increased AROM to 10 deg DF, 20 deg eversion to allow for proper joint functioning as indicated by patients Functional Deficits. [x]? Progressing: []? Met: []? Not Met: []? Adjusted  3. Patient will demonstrate an increase in Strength to grossly 4+/5 at R ankle in LE to allow for proper functional mobility as indicated by patients Functional Deficits. [x]? Progressing: []? Met: []? Not Met: []? Adjusted  4. Patient will return to walking and reciprocal stair negotiation w/o boot without increased symptoms or restriction. []? Progressing: []? Met: []? Not Met: []? Adjusted  5. Patient will return to recreational workouts and standing at work without increased symptoms or restriction (patient specific functional goal)    []? Progressing: []? Met: []? Not Met: []? Adjusted      Overall Progression Towards Functional goals/ Treatment Progress Update:  [] Patient is progressing as expected towards functional goals listed. [] Progression is slowed due to complexities/Impairments listed. [] Progression has been slowed due to co-morbidities. [x] Plan just implemented, too soon to assess goals progression <30days   [] Goals require adjustment due to lack of progress  [] Patient is not progressing as expected and requires additional follow up with physician  [] Other    Prognosis for POC: [x] Good [] Fair  [] Poor      Patient requires continued skilled intervention: [x] Yes  [] No    Treatment/Activity Tolerance:  [x] Patient able to complete treatment  [] Patient limited by fatigue  [] Patient limited by pain    [] Patient limited by other medical complications  [] Other:     ASSESSMENT: Patient tolerated all TE well this date. Added in more challenging strengthening, but not as much standing TE as two visits. Will reassess NV and continue to progress as tolerated.      PLAN:   [x] Continue per plan of care [] Alter current plan (see comments above)  [] Plan of care initiated [] Hold pending MD visit [] Discharge      Electronically signed by:  Annette Kaplan, PT, DPT [id number]       Note: If patient does not return for scheduled/ recommended follow up visits, this note will serve as a discharge from care along with most recent update on progress.

## 2020-03-04 ENCOUNTER — OFFICE VISIT (OUTPATIENT)
Dept: ORTHOPEDIC SURGERY | Age: 30
End: 2020-03-04
Payer: COMMERCIAL

## 2020-03-04 ENCOUNTER — HOSPITAL ENCOUNTER (OUTPATIENT)
Dept: PHYSICAL THERAPY | Age: 30
Setting detail: THERAPIES SERIES
Discharge: HOME | End: 2020-03-04
Payer: COMMERCIAL

## 2020-03-04 VITALS
WEIGHT: 197.09 LBS | HEIGHT: 66 IN | BODY MASS INDEX: 31.68 KG/M2 | HEART RATE: 84 BPM | DIASTOLIC BLOOD PRESSURE: 86 MMHG | SYSTOLIC BLOOD PRESSURE: 122 MMHG

## 2020-03-04 PROCEDURE — 97140 MANUAL THERAPY 1/> REGIONS: CPT

## 2020-03-04 PROCEDURE — 99213 OFFICE O/P EST LOW 20 MIN: CPT | Performed by: FAMILY MEDICINE

## 2020-03-04 PROCEDURE — 97110 THERAPEUTIC EXERCISES: CPT

## 2020-03-04 NOTE — FLOWSHEET NOTE
posture, motor skill, proprioception of core, proximal hip and LE for self care, mobility, lifting, and ambulation/stair navigation      Manual Treatments:  PROM / STM / Oscillations-Mobs:  G-I, II, III, IV (PA's, Inf., Post.)  [x] (60848) Provided manual therapy to mobilize LE, proximal hip and/or LS spine soft tissue/joints for the purpose of modulating pain, promoting relaxation,  increasing ROM, reducing/eliminating soft tissue swelling/inflammation/restriction, improving soft tissue extensibility and allowing for proper ROM for normal function with self care, mobility, lifting and ambulation. Modalities:  CP to posterior and lateral R ankle in long sitting - x15'   [] GAME READY (VASO)- for significant edema, swelling, pain control. Charges:  Timed Code Treatment Minutes: 40'   Total Treatment Minutes: 55     [] EVAL (LOW) 62949 (typically 20 minutes face-to-face)  [] EVAL (MOD) 98329 (typically 30 minutes face-to-face)  [] EVAL (HIGH) 10234 (typically 45 minutes face-to-face)  [] RE-EVAL     [x] KM(15743) x  2   [] IONTO  [] NMR (68790) x     [] VASO  [x] Manual (38652) x  1    [] Other:  [] TA x      [] Mech Traction (11990)  [] ES(attended) (87343)      [] ES (un) (56383):       GOALS:   Patient stated goal: \"no pain, more mobility\"  []? Progressing: []? Met: []? Not Met: []? Adjusted     Therapist goals for Patient:   Short Term Goals: To be achieved in: 2 weeks  1. Independent in HEP and progression per patient tolerance, in order to prevent re-injury. [x]? Progressing: []? Met: []? Not Met: []? Adjusted  2. Patient will have a decrease in pain to facilitate improvement in movement, function, and ADLs as indicated by Functional Deficits. [x]? Progressing: []? Met: []? Not Met: []? Adjusted     Long Term Goals: To be achieved in:8 weeks  1. Disability index score of 19% or less for the LEFS to assist with reaching prior level of function. []? Progressing: []? Met: []? Not Met: []? Adjusted  2.

## 2020-03-04 NOTE — PROGRESS NOTES
Chief Complaint  Foot Pain (CK RIGHT FOOT)      Imani Madsen is a 34 y.o. 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 Murphy Boston who is being seen today follow-up on his likely overuse right peroneal tendinitis    History of Present Illness for Follow Up Patient:      Jackie Snow is being seen in follow up today for acute right foot pain. Jackie Snow is nearly 3 weeks out from initial injury. The patient rates their current pain as a 0 out of 10 on the pain scale. Patient finished steroid pack and is taking nsaid consistently twice a day. He has started physical therapy and the therapist suggested starting to wean out of his boot. He states he tried 6 hours the first day and was a little sore by the end of 6 when he put his boot back on. But is back to feeling good today. Treatment to date includes: rest, ice, NSAID- lodine, oral steroid- medrol, physical therapy, home exercises to treat this problem and symptoms are improving. Imani Madsen reports a 90-95 % improvement in symptoms. He has had 4 sessions of therapy thus far and is independent with his home-based exercises but has not yet totally gotten out of the boot or start a functional progression in therapy. He does play recreational volleyball in the summer. Pain Assessment  Location of Pain: Foot  Location Modifiers: Right  Severity of Pain: 0     Attest: I have reviewed and attest the documentation of the HPI documented by my . I will make any changes if necessary. Enc Date: 3/4/2020  Time: 10:15 AM  Provider: Erica Mariscal MD        Social History     Tobacco Use    Smoking status: Never Smoker    Smokeless tobacco: Never Used   Substance Use Topics    Alcohol use:  Yes     Alcohol/week: 6.0 standard drinks     Types: 6 Cans of beer per week     Comment: occassionally    Drug use: No        Review of Systems  Pertinent items are noted in HPI  Review of systems reviewed from Patient History Form dated on 2/19/2020 and available in the patient's chart under the Media tab. Vital Signs     /86   Pulse 84   Ht 5' 5.98\" (1.676 m)   Wt 197 lb 1.5 oz (89.4 kg)   BMI 31.83 kg/m²       General Exam:   Constitutional: Patient is adequately groomed with no evidence of malnutrition  DTRs: Deep tendon reflexes are intact  Mental Status: The patient is oriented to time, place and person. The patient's mood and affect are appropriate. Lymphatic: The lymphatic examination bilaterally reveals all areas to be without enlargement or induration. Vascular: Examination reveals no swelling or calf tenderness. Peripheral pulses are palpable and 2+. Neurological: The patient has good coordination. There is no weakness or sensory deficit. Right foot examination     Inspection: There is no high-grade deformity soft tissue swelling or palpable masses.     Palpation:  He is now effectively none tender over the peroneal brevis and mildly over the base of the fifth metatarsal but there is no bony step-offs or soft tissue swelling. No medial or lateral ankle ligamentous or additional midfoot tenderness. No Lisfranc or definitive metatarsal shaft tenderness.     Rang of Motion:  Substantially improved ankle motion     Strength:  Improved 4+ out of 5 with resisted eversion and dorsiflexion involving his right foot and ankle.     Special Tests: Negative anterior drawer talar tilt and Aragon's testing. No evidence of peroneal subluxation.     Skin: There are no rashes, ulcerations or lesions. Distal motor sensory and vascular exam is intact.     Gait:  Normal gait with overpronation utilizing his off-the-shelf inserts     Reflex symmetrically preserved       Additional Comments:                 Additional Examinations:     Contralateral Exam: Examination of the left ankle reveals intact skin. There is no focal tenderness or swelling.   The patient demonstrates full painless range of motion with regards to plantarflexion, dorsiflexion, inversion, eversion. Strength is 5/5 thorough out all planes. Ligamentous stability is grossly intact.     Right Lower Extremity: Examination of the right lower extremity does not show any tenderness, deformity or injury. Range of motion is unremarkable. There is no gross instability. There are no rashes, ulcerations or lesions. Strength and tone are normal.  Left Lower Extremity: Examination of the left lower extremity does not show any tenderness, deformity or injury. Range of motion is unremarkable. There is no gross instability. There are no rashes, ulcerations or lesions. Strength and tone are normal.          Diagnostic Test Findings: Xr Foot Right (min 3 Views)     Result Date: 2/19/2020  Radiology exam is complete. No Radiologist dictation. Please follow up with ordering provider. Right foot AP lateral and oblique films were reviewed from 2/19/2020 did not show evidence of obvious osseous injury or obvious signs of stress injury.         Assessment & Plan:    Encounter Diagnoses   Name Primary?  Right foot pain Yes    Peroneal tendonitis of right lower extremity     Pes planus of both feet        Orders Placed This Encounter   Procedures    Ambulatory referral to Physical Therapy     Referral Priority:   Routine     Referral Type:   Eval and Treat     Referral Reason:   Specialty Services Required     Requested Specialty:   Physical Therapy     Number of Visits Requested:   1         Treatment Plan:  Treatment options were discussed with Rae Hsu. We did once again review his plain films and exam findings. He is now 3 weeks out from the onset of his symptoms most consistent with reactive peroneal tendinitis. He is doing much better at this point stating that he is 90 to 95% improved. I think given the okay to begin to wean out of the boot and appropriate footwear and continue with his off-the-shelf power step inserts.   He

## 2020-03-11 ENCOUNTER — HOSPITAL ENCOUNTER (OUTPATIENT)
Dept: PHYSICAL THERAPY | Age: 30
Setting detail: THERAPIES SERIES
Discharge: HOME | End: 2020-03-11
Payer: COMMERCIAL

## 2020-03-11 PROCEDURE — 97110 THERAPEUTIC EXERCISES: CPT

## 2020-03-11 PROCEDURE — 97140 MANUAL THERAPY 1/> REGIONS: CPT

## 2020-03-11 NOTE — FLOWSHEET NOTE
improving balance, coordination, kinesthetic sense, posture, motor skill, proprioception of core, proximal hip and LE for self care, mobility, lifting, and ambulation/stair navigation      Manual Treatments:  PROM / STM / Oscillations-Mobs:  G-I, II, III, IV (PA's, Inf., Post.)  [x] (14680) Provided manual therapy to mobilize LE, proximal hip and/or LS spine soft tissue/joints for the purpose of modulating pain, promoting relaxation,  increasing ROM, reducing/eliminating soft tissue swelling/inflammation/restriction, improving soft tissue extensibility and allowing for proper ROM for normal function with self care, mobility, lifting and ambulation. Modalities:  CP to posterior and lateral R ankle in long sitting - x15'   [] GAME READY (VASO)- for significant edema, swelling, pain control. Charges:  Timed Code Treatment Minutes: 52'   Total Treatment Minutes: 64     [] EVAL (LOW) 16024 (typically 20 minutes face-to-face)  [] EVAL (MOD) 68057 (typically 30 minutes face-to-face)  [] EVAL (HIGH) 58938 (typically 45 minutes face-to-face)  [] RE-EVAL     [x] RT(15107) x  2   [] IONTO  [] NMR (35314) x     [] VASO  [x] Manual (70318) x  1    [] Other:  [] TA x      [] Mech Traction (48537)  [] ES(attended) (30929)      [] ES (un) (81315):       GOALS:   Patient stated goal: \"no pain, more mobility\"  []? Progressing: []? Met: []? Not Met: []? Adjusted     Therapist goals for Patient:   Short Term Goals: To be achieved in: 2 weeks  1. Independent in HEP and progression per patient tolerance, in order to prevent re-injury. [x]? Progressing: []? Met: []? Not Met: []? Adjusted  2. Patient will have a decrease in pain to facilitate improvement in movement, function, and ADLs as indicated by Functional Deficits. [x]? Progressing: []? Met: []? Not Met: []? Adjusted     Long Term Goals: To be achieved in:8 weeks  1. Disability index score of 19% or less for the LEFS to assist with reaching prior level of function.    []?

## 2020-03-13 ENCOUNTER — HOSPITAL ENCOUNTER (OUTPATIENT)
Dept: PHYSICAL THERAPY | Age: 30
Setting detail: THERAPIES SERIES
Discharge: HOME | End: 2020-03-13
Payer: COMMERCIAL

## 2020-03-13 PROCEDURE — 97112 NEUROMUSCULAR REEDUCATION: CPT

## 2020-03-13 PROCEDURE — 97140 MANUAL THERAPY 1/> REGIONS: CPT

## 2020-03-13 PROCEDURE — 97110 THERAPEUTIC EXERCISES: CPT

## 2020-03-13 PROCEDURE — 97016 VASOPNEUMATIC DEVICE THERAPY: CPT

## 2020-03-13 NOTE — FLOWSHEET NOTE
improving balance, coordination, kinesthetic sense, posture, motor skill, proprioception of core, proximal hip and LE for self care, mobility, lifting, and ambulation/stair navigation      Manual Treatments:  PROM / STM / Oscillations-Mobs:  G-I, II, III, IV (PA's, Inf., Post.)  [x] (52450) Provided manual therapy to mobilize LE, proximal hip and/or LS spine soft tissue/joints for the purpose of modulating pain, promoting relaxation,  increasing ROM, reducing/eliminating soft tissue swelling/inflammation/restriction, improving soft tissue extensibility and allowing for proper ROM for normal function with self care, mobility, lifting and ambulation. Modalities:  CP to posterior and lateral R ankle in long sitting - x15'   [] GAME READY (VASO)- for significant edema, swelling, pain control. Charges:  Timed Code Treatment Minutes: 52'   Total Treatment Minutes: 64     [] EVAL (LOW) 32252 (typically 20 minutes face-to-face)  [] EVAL (MOD) 80592 (typically 30 minutes face-to-face)  [] EVAL (HIGH) 49685 (typically 45 minutes face-to-face)  [] RE-EVAL     [x] MV(10595) x  2   [] IONTO  [] NMR (42156) x     [] VASO  [x] Manual (01569) x  1    [] Other:  [] TA x      [] Mech Traction (43919)  [] ES(attended) (73626)      [] ES (un) (18548):       GOALS:   Patient stated goal: \"no pain, more mobility\"  []? Progressing: []? Met: []? Not Met: []? Adjusted     Therapist goals for Patient:   Short Term Goals: To be achieved in: 2 weeks  1. Independent in HEP and progression per patient tolerance, in order to prevent re-injury. [x]? Progressing: []? Met: []? Not Met: []? Adjusted  2. Patient will have a decrease in pain to facilitate improvement in movement, function, and ADLs as indicated by Functional Deficits. [x]? Progressing: []? Met: []? Not Met: []? Adjusted     Long Term Goals: To be achieved in:8 weeks  1. Disability index score of 19% or less for the LEFS to assist with reaching prior level of function.    []? Progressing: []? Met: []? Not Met: []? Adjusted  2. Patient will demonstrate increased AROM to 10 deg DF, 20 deg eversion to allow for proper joint functioning as indicated by patients Functional Deficits. [x]? Progressing: []? Met: []? Not Met: []? Adjusted  3. Patient will demonstrate an increase in Strength to grossly 4+/5 at R ankle in LE to allow for proper functional mobility as indicated by patients Functional Deficits. [x]? Progressing: []? Met: []? Not Met: []? Adjusted  4. Patient will return to walking and reciprocal stair negotiation w/o boot without increased symptoms or restriction. []? Progressing: []? Met: []? Not Met: []? Adjusted  5. Patient will return to recreational workouts and standing at work without increased symptoms or restriction (patient specific functional goal)    []? Progressing: []? Met: []? Not Met: []? Adjusted      Overall Progression Towards Functional goals/ Treatment Progress Update:  [] Patient is progressing as expected towards functional goals listed. [] Progression is slowed due to complexities/Impairments listed. [] Progression has been slowed due to co-morbidities. [x] Plan just implemented, too soon to assess goals progression <30days   [] Goals require adjustment due to lack of progress  [] Patient is not progressing as expected and requires additional follow up with physician  [] Other    Prognosis for POC: [x] Good [] Fair  [] Poor      Patient requires continued skilled intervention: [x] Yes  [] No    Treatment/Activity Tolerance:  [x] Patient able to complete treatment  [] Patient limited by fatigue  [] Patient limited by pain    [] Patient limited by other medical complications  [] Other:     ASSESSMENT: Patient id well with new standing and single limb challenges today. Initiated ATC and discussed progressing towards independent gym program.  Overall he is making good progress towards returning to work without boot without issues.   Benefits from PT to continue strengthening to avoid reinjury. PLAN: SL strength, balance challenges   [x] Continue per plan of care [] Alter current plan (see comments above)  [] Plan of care initiated [] Hold pending MD visit [] Discharge      Electronically signed by:  Inez Kamara, PT, DPT [id number]       Note: If patient does not return for scheduled/ recommended follow up visits, this note will serve as a discharge from care along with most recent update on progress.

## 2020-03-13 NOTE — FLOWSHEET NOTE
[facility], [address] [place]  Phone: [phone]  Fax [phone]      ATHLETIC TRAINING [address]  Date:  3/13/2020    Patient Name:  Nicole Fernandez    :  1990  MRN: [id number]  Restrictions/Precautions:    Medical/Treatment Diagnosis Information:  ·   M76.71 (ICD-10-CM) - Peroneal tendonitis of right lower extremity  ·   R ankle pain (M25.571), R foot pain (M79.671)  Physician Information:    Dr. 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 # 2x12                     Ecc. IH 70# 2x12                     Inv. Calf Press Navin. Ecc.                        Inv.        SUHA   Flex               ABd 45# R/L 2x12              ADd              TKE               Ext 60# R/L 2x12       Steps Up W/ PT              Up and Over               Down               Lateral               Rotation        Squats  mini                  wall                 BOSU         Lunges:  Lunge to Balance                   Balance to Lunge                   Walking        Knee Extension Bilat.                                                Ecc. Inv.        Hamstring Curls Bilat. Ecc.                               Inv.        Soleus Press Bilat. IH 30# 2x12                          Ecc.                           Inv.                             Ladders                Square               Jump/Hop  Low                      Med.                      High                                                            Modality GR 15'   Initials                             DB/BH   Time spent one on one (workers comp)    Time spent with PT assistant

## 2020-03-16 ENCOUNTER — HOSPITAL ENCOUNTER (OUTPATIENT)
Dept: PHYSICAL THERAPY | Age: 30
Setting detail: THERAPIES SERIES
Discharge: HOME | End: 2020-03-16
Payer: COMMERCIAL

## 2020-03-16 PROCEDURE — 97110 THERAPEUTIC EXERCISES: CPT | Performed by: PHYSICAL THERAPIST

## 2020-03-16 PROCEDURE — 97112 NEUROMUSCULAR REEDUCATION: CPT | Performed by: PHYSICAL THERAPIST

## 2020-03-16 PROCEDURE — 97140 MANUAL THERAPY 1/> REGIONS: CPT | Performed by: PHYSICAL THERAPIST

## 2020-03-16 NOTE — FLOWSHEET NOTE
Patient will have a decrease in pain to facilitate improvement in movement, function, and ADLs as indicated by Functional Deficits. [x]? Progressing: []? Met: []? Not Met: []? Adjusted     Long Term Goals: To be achieved in:8 weeks  1. Disability index score of 19% or less for the LEFS to assist with reaching prior level of function. []? Progressing: []? Met: []? Not Met: []? Adjusted  2. Patient will demonstrate increased AROM to 10 deg DF, 20 deg eversion to allow for proper joint functioning as indicated by patients Functional Deficits. [x]? Progressing: []? Met: []? Not Met: []? Adjusted  3. Patient will demonstrate an increase in Strength to grossly 4+/5 at R ankle in LE to allow for proper functional mobility as indicated by patients Functional Deficits. [x]? Progressing: []? Met: []? Not Met: []? Adjusted  4. Patient will return to walking and reciprocal stair negotiation w/o boot without increased symptoms or restriction. []? Progressing: []? Met: []? Not Met: []? Adjusted  5. Patient will return to recreational workouts and standing at work without increased symptoms or restriction (patient specific functional goal)    []? Progressing: []? Met: []? Not Met: []? Adjusted      Overall Progression Towards Functional goals/ Treatment Progress Update:  [] Patient is progressing as expected towards functional goals listed. [] Progression is slowed due to complexities/Impairments listed. [] Progression has been slowed due to co-morbidities.   [x] Plan just implemented, too soon to assess goals progression <30days   [] Goals require adjustment due to lack of progress  [] Patient is not progressing as expected and requires additional follow up with physician  [] Other    Prognosis for POC: [x] Good [] Fair  [] Poor      Patient requires continued skilled intervention: [x] Yes  [] No    Treatment/Activity Tolerance:  [x] Patient able to complete treatment  [] Patient limited by fatigue  [] Patient limited

## 2020-03-18 ENCOUNTER — HOSPITAL ENCOUNTER (OUTPATIENT)
Dept: PHYSICAL THERAPY | Age: 30
Setting detail: THERAPIES SERIES
Discharge: HOME | End: 2020-03-18
Payer: COMMERCIAL

## 2020-03-18 PROCEDURE — 97016 VASOPNEUMATIC DEVICE THERAPY: CPT

## 2020-03-18 PROCEDURE — 97140 MANUAL THERAPY 1/> REGIONS: CPT

## 2020-03-18 PROCEDURE — 97110 THERAPEUTIC EXERCISES: CPT

## 2020-03-18 NOTE — FLOWSHEET NOTE
and LE for self care, mobility, lifting, and ambulation/stair navigation      Manual Treatments:  PROM / STM / Oscillations-Mobs:  G-I, II, III, IV (PA's, Inf., Post.)  [x] (35705) Provided manual therapy to mobilize LE, proximal hip and/or LS spine soft tissue/joints for the purpose of modulating pain, promoting relaxation,  increasing ROM, reducing/eliminating soft tissue swelling/inflammation/restriction, improving soft tissue extensibility and allowing for proper ROM for normal function with self care, mobility, lifting and ambulation. Modalities: vaso to posterior/lateral R ankle and foot arch  in long sitting - x15'   [] GAME READY (VASO)- for significant edema, swelling, pain control. Charges:  Timed Code Treatment Minutes: 45'   Total Treatment Minutes: 53     [] EVAL (LOW) 33480 (typically 20 minutes face-to-face)  [] EVAL (MOD) 97644 (typically 30 minutes face-to-face)  [] EVAL (HIGH) 39826 (typically 45 minutes face-to-face)  [] RE-EVAL     [x] FN(93243) x  2   [] IONTO  [] NMR (31243) x     [x] VASO  [x] Manual (56984) x  1    [] Other:  [] TA x      [] Mech Traction (09124)  [] ES(attended) (08410)      [] ES (un) (91008):       GOALS:   Patient stated goal: \"no pain, more mobility\"  []? Progressing: []? Met: []? Not Met: []? Adjusted     Therapist goals for Patient:   Short Term Goals: To be achieved in: 2 weeks  1. Independent in HEP and progression per patient tolerance, in order to prevent re-injury. [x]? Progressing: []? Met: []? Not Met: []? Adjusted  2. Patient will have a decrease in pain to facilitate improvement in movement, function, and ADLs as indicated by Functional Deficits. [x]? Progressing: []? Met: []? Not Met: []? Adjusted     Long Term Goals: To be achieved in:8 weeks  1. Disability index score of 19% or less for the LEFS to assist with reaching prior level of function. []? Progressing: []? Met: []? Not Met: []? Adjusted  2.  Patient will demonstrate increased AROM to 10 deg DF, 20 deg eversion to allow for proper joint functioning as indicated by patients Functional Deficits. [x]? Progressing: []? Met: []? Not Met: []? Adjusted  3. Patient will demonstrate an increase in Strength to grossly 4+/5 at R ankle in LE to allow for proper functional mobility as indicated by patients Functional Deficits. [x]? Progressing: []? Met: []? Not Met: []? Adjusted  4. Patient will return to walking and reciprocal stair negotiation w/o boot without increased symptoms or restriction. []? Progressing: []? Met: []? Not Met: []? Adjusted  5. Patient will return to recreational workouts and standing at work without increased symptoms or restriction (patient specific functional goal)    []? Progressing: []? Met: []? Not Met: []? Adjusted      Overall Progression Towards Functional goals/ Treatment Progress Update:  [] Patient is progressing as expected towards functional goals listed. [] Progression is slowed due to complexities/Impairments listed. [] Progression has been slowed due to co-morbidities. [x] Plan just implemented, too soon to assess goals progression <30days   [] Goals require adjustment due to lack of progress  [] Patient is not progressing as expected and requires additional follow up with physician  [] Other    Prognosis for POC: [x] Good [] Fair  [] Poor      Patient requires continued skilled intervention: [x] Yes  [] No    Treatment/Activity Tolerance:  [x] Patient able to complete treatment  [] Patient limited by fatigue  [] Patient limited by pain    [] Patient limited by other medical complications  [] Other:     ASSESSMENT: Patient doing well at this time, continues to benefit from PT to address strength and stability at the ankle.     PLAN: SL strength, balance challenges, gym HEP   [x] Continue per plan of care [] Alter current plan (see comments above)  [] Plan of care initiated [] Hold pending MD visit [] Discharge      Electronically signed by:  Blaire Brito

## 2020-03-23 ENCOUNTER — APPOINTMENT (OUTPATIENT)
Dept: PHYSICAL THERAPY | Age: 30
End: 2020-03-23
Payer: COMMERCIAL

## 2020-03-24 ENCOUNTER — TELEPHONE (OUTPATIENT)
Dept: PHYSICAL THERAPY | Age: 30
End: 2020-03-24

## 2020-03-24 NOTE — TELEPHONE ENCOUNTER
Called patient to notify that therapy will be on hold with current COVID-19 situation as pt does not have urgent needs for therapy services at this time. Left voicemail and patient later called back with his email address so HEP could be updated via MST. Pt agreeable to plan.     Jennifer Blanco, DPT [id number]

## 2020-03-30 ENCOUNTER — APPOINTMENT (OUTPATIENT)
Dept: PHYSICAL THERAPY | Age: 30
End: 2020-03-30
Payer: COMMERCIAL

## 2020-04-15 ENCOUNTER — OFFICE VISIT (OUTPATIENT)
Dept: PRIMARY CARE CLINIC | Age: 30
End: 2020-04-15
Payer: COMMERCIAL

## 2020-04-15 VITALS — TEMPERATURE: 98.5 F | HEART RATE: 82 BPM | OXYGEN SATURATION: 97 %

## 2020-04-15 PROCEDURE — 99212 OFFICE O/P EST SF 10 MIN: CPT | Performed by: NURSE PRACTITIONER

## 2020-04-15 NOTE — PROGRESS NOTES
4/15/2020    FLU/COVID-19 CLINIC EVALUATION    HPI  SYMPTOMS:    Symptom duration, days:  [] 1   [x] 2   [] 3   [] 4   [] 5   [] 6   [] 7   [] 8   [] 9   [] 10   [] 11   [] 12   [] 13 [] 14 +    Symptom course:   [] Worsening     [x] Stable     [] Improving    [] Fevers  [] Symptom (not measured)  [] Measured (Result: )  [] Chills  [x] Cough  [] Coughing up blood  [] Productive  [x] Dry  [x] Chest Congestion  [] Chest Tightness  [x] Nasal Congestion  [] Runny Nose  [] Feeling short of breath  [] Fatigue  [] Chest pain  [x] Headaches  []Tolerable  [] Severe  [] Sore throat  [] Muscle aches  [] Nausea  [] Decreased appetite  [] Vomiting  []Unable to keep fluids down  [] Diarrhea  []Severe    [] OTHER SYMPTOMS:      RISK FACTORS:    [] Pregnant or possibly pregnant  [] Age over 61  [] Diabetes  [] Heart disease  [] Asthma  [] COPD/Other chronic lung diseases  [] Active Cancer  [] On Chemotherapy  [] Taking oral steroids  [] History Lymphoma/Leukemia  [] Close contact with a lab confirmed COVID-19 patient within 14 days of symptom onset  [] History of travel from affected geographical areas within 14 days of symptom onset  [] Health Care Worker Exposure no symptoms  [x] Health Care Worker Exposure symptomatic    Works at [employer] on [address] [address], was not wearing PPE when exposed on 4/6/20      VITALS:  Shawn Lathe:    04/15/20 1237   Pulse: 82   Temp: 98.5 °F (36.9 °C)   TempSrc: Oral   SpO2: 97%      PHYSICAL EXAMINATION:  [x]Alert   [x]Oriented to person/place/time    [x]No apparent distress     []Toxic appearing    [x]Breathing appears normal     []Appears tachypneic         [x]Speaking in full sentences     TESTS:  POCT FLU:  [] Positive     []Negative  POCT STREP:  [] Positive     []Negative    [x] COVID-19 Test sent: red      ASSESSMENT:  [] Flu  [] Strep Throat  [] Uncertain Viral Respiratory Illness  [x] Possible COVID-19  [x] Exposure COVID-19  [] Other    PLAN:    [x] Discharge home with written instructions for:  []

## 2020-04-18 ENCOUNTER — CARE COORDINATION (OUTPATIENT)
Dept: FAMILY MEDICINE CLINIC | Age: 30
End: 2020-04-18

## 2020-04-21 LAB
Lab: NORMAL
REPORT: NORMAL
SARS-COV-2: NOT DETECTED
THIS TEST SENT TO: NORMAL

## 2020-04-21 NOTE — RESULT ENCOUNTER NOTE
Your test for COVID-19, also known as novel coronavirus, came back negative. No virus was detected from the sample from your nose. Until your symptoms are fully resolved, you may still be contagious. We recommend that you remain isolated for 7 days minimum or 72 hours after your symptoms have completely resolved, whichever is longer. For example, if all symptoms improve after 2 days, you should still remain isolated for 7 days. If your symptoms get better after 10 days, you should remain isolated for 13 days. Continually monitor symptoms. Contact a medical provider if symptoms are worsening. If you have any additional questions, contact your doctor.     For additional information, please visit the Centers for Disease Control and Prevention ([email or web address].cy

## 2020-04-22 ENCOUNTER — CARE COORDINATION (OUTPATIENT)
Dept: FAMILY MEDICINE CLINIC | Age: 30
End: 2020-04-22

## 2020-11-04 LAB — SARS-COV-2: NOT DETECTED

## 2021-01-26 ENCOUNTER — NURSE TRIAGE (OUTPATIENT)
Dept: OTHER | Facility: CLINIC | Age: 31
End: 2021-01-26

## 2021-01-26 ENCOUNTER — HOSPITAL ENCOUNTER (EMERGENCY)
Age: 31
Discharge: HOME | End: 2021-01-26
Attending: EMERGENCY MEDICINE
Payer: COMMERCIAL

## 2021-01-26 ENCOUNTER — HOSPITAL ENCOUNTER (OUTPATIENT)
Dept: CT IMAGING | Age: 31
Discharge: HOME | End: 2021-01-26
Payer: COMMERCIAL

## 2021-01-26 VITALS
HEART RATE: 70 BPM | TEMPERATURE: 97.8 F | OXYGEN SATURATION: 100 % | SYSTOLIC BLOOD PRESSURE: 137 MMHG | DIASTOLIC BLOOD PRESSURE: 99 MMHG | WEIGHT: 200 LBS | BODY MASS INDEX: 32.3 KG/M2 | RESPIRATION RATE: 15 BRPM

## 2021-01-26 DIAGNOSIS — R10.9 RIGHT FLANK PAIN: ICD-10-CM

## 2021-01-26 DIAGNOSIS — N13.9 OBSTRUCTED, UROPATHY: Primary | ICD-10-CM

## 2021-01-26 LAB
A/G RATIO: 1 (ref 1.1–2.2)
ALBUMIN SERPL-MCNC: 3.8 G/DL (ref 3.4–5)
ALP BLD-CCNC: 108 U/L (ref 40–129)
ALT SERPL-CCNC: 12 U/L (ref 10–40)
ANION GAP SERPL CALCULATED.3IONS-SCNC: 10 MMOL/L (ref 3–16)
AST SERPL-CCNC: 30 U/L (ref 15–37)
BACTERIA: ABNORMAL /HPF
BASOPHILS ABSOLUTE: 0 K/UL (ref 0–0.2)
BASOPHILS RELATIVE PERCENT: 0.4 %
BILIRUB SERPL-MCNC: 0.3 MG/DL (ref 0–1)
BILIRUBIN URINE: NEGATIVE
BLOOD, URINE: ABNORMAL
BUN BLDV-MCNC: 13 MG/DL (ref 7–20)
CALCIUM SERPL-MCNC: 9.2 MG/DL (ref 8.3–10.6)
CHLORIDE BLD-SCNC: 103 MMOL/L (ref 99–110)
CLARITY: CLEAR
CO2: 25 MMOL/L (ref 21–32)
COLOR: YELLOW
CREAT SERPL-MCNC: 0.8 MG/DL (ref 0.9–1.3)
EOSINOPHILS ABSOLUTE: 0.2 K/UL (ref 0–0.6)
EOSINOPHILS RELATIVE PERCENT: 3.1 %
GFR AFRICAN AMERICAN: >60
GFR NON-AFRICAN AMERICAN: >60
GLOBULIN: 3.7 G/DL
GLUCOSE BLD-MCNC: 90 MG/DL (ref 70–99)
GLUCOSE URINE: NEGATIVE MG/DL
HCT VFR BLD CALC: 43.6 % (ref 40.5–52.5)
HEMOGLOBIN: 14.9 G/DL (ref 13.5–17.5)
HYALINE CASTS: ABNORMAL /LPF (ref 0–2)
KETONES, URINE: NEGATIVE MG/DL
LEUKOCYTE ESTERASE, URINE: ABNORMAL
LYMPHOCYTES ABSOLUTE: 1.9 K/UL (ref 1–5.1)
LYMPHOCYTES RELATIVE PERCENT: 25.1 %
MCH RBC QN AUTO: 28.8 PG (ref 26–34)
MCHC RBC AUTO-ENTMCNC: 34.2 G/DL (ref 31–36)
MCV RBC AUTO: 84.2 FL (ref 80–100)
MICROSCOPIC EXAMINATION: YES
MONOCYTES ABSOLUTE: 0.7 K/UL (ref 0–1.3)
MONOCYTES RELATIVE PERCENT: 9.5 %
MUCUS: ABNORMAL /LPF
NEUTROPHILS ABSOLUTE: 4.7 K/UL (ref 1.7–7.7)
NEUTROPHILS RELATIVE PERCENT: 61.9 %
NITRITE, URINE: NEGATIVE
PDW BLD-RTO: 13.6 % (ref 12.4–15.4)
PH UA: 6 (ref 5–8)
PLATELET # BLD: 222 K/UL (ref 135–450)
PMV BLD AUTO: 7.5 FL (ref 5–10.5)
POTASSIUM SERPL-SCNC: 4.3 MMOL/L (ref 3.5–5.1)
PROTEIN UA: NEGATIVE MG/DL
RBC # BLD: 5.18 M/UL (ref 4.2–5.9)
RBC UA: ABNORMAL /HPF (ref 0–4)
SODIUM BLD-SCNC: 138 MMOL/L (ref 136–145)
SPECIFIC GRAVITY UA: 1.02 (ref 1–1.03)
TOTAL PROTEIN: 7.5 G/DL (ref 6.4–8.2)
URINE TYPE: ABNORMAL
UROBILINOGEN, URINE: 0.2 E.U./DL
WBC # BLD: 7.6 K/UL (ref 4–11)
WBC UA: >100 /HPF (ref 0–5)

## 2021-01-26 PROCEDURE — 85025 COMPLETE CBC W/AUTO DIFF WBC: CPT

## 2021-01-26 PROCEDURE — 96374 THER/PROPH/DIAG INJ IV PUSH: CPT

## 2021-01-26 PROCEDURE — 99283 EMERGENCY DEPT VISIT LOW MDM: CPT

## 2021-01-26 PROCEDURE — 2580000003 HC RX 258: Performed by: PHYSICIAN ASSISTANT

## 2021-01-26 PROCEDURE — 74176 CT ABD & PELVIS W/O CONTRAST: CPT

## 2021-01-26 PROCEDURE — 6360000002 HC RX W HCPCS: Performed by: PHYSICIAN ASSISTANT

## 2021-01-26 PROCEDURE — 87077 CULTURE AEROBIC IDENTIFY: CPT

## 2021-01-26 PROCEDURE — 87086 URINE CULTURE/COLONY COUNT: CPT

## 2021-01-26 PROCEDURE — 80053 COMPREHEN METABOLIC PANEL: CPT

## 2021-01-26 PROCEDURE — 96375 TX/PRO/DX INJ NEW DRUG ADDON: CPT

## 2021-01-26 PROCEDURE — 81001 URINALYSIS AUTO W/SCOPE: CPT

## 2021-01-26 RX ORDER — CEPHALEXIN 500 MG/1
500 CAPSULE ORAL 2 TIMES DAILY
Qty: 20 CAPSULE | Refills: 0 | Status: SHIPPED | OUTPATIENT
Start: 2021-01-26 | End: 2021-01-28

## 2021-01-26 RX ORDER — KETOROLAC TROMETHAMINE 10 MG/1
10 TABLET, FILM COATED ORAL EVERY 6 HOURS PRN
Qty: 20 TABLET | Refills: 0 | Status: SHIPPED | OUTPATIENT
Start: 2021-01-26 | End: 2021-01-26 | Stop reason: SDUPTHER

## 2021-01-26 RX ORDER — CEPHALEXIN 500 MG/1
500 CAPSULE ORAL 2 TIMES DAILY
Qty: 20 CAPSULE | Refills: 0 | Status: SHIPPED | OUTPATIENT
Start: 2021-01-26 | End: 2021-01-26 | Stop reason: SDUPTHER

## 2021-01-26 RX ORDER — KETOROLAC TROMETHAMINE 30 MG/ML
30 INJECTION, SOLUTION INTRAMUSCULAR; INTRAVENOUS ONCE
Status: COMPLETED | OUTPATIENT
Start: 2021-01-26 | End: 2021-01-26

## 2021-01-26 RX ORDER — KETOROLAC TROMETHAMINE 10 MG/1
10 TABLET, FILM COATED ORAL EVERY 6 HOURS PRN
Qty: 20 TABLET | Refills: 0 | Status: SHIPPED | OUTPATIENT
Start: 2021-01-26 | End: 2022-03-03

## 2021-01-26 RX ADMIN — CEFTRIAXONE SODIUM 1000 MG: 1 INJECTION, POWDER, FOR SOLUTION INTRAMUSCULAR; INTRAVENOUS at 18:21

## 2021-01-26 RX ADMIN — KETOROLAC TROMETHAMINE 30 MG: 30 INJECTION, SOLUTION INTRAMUSCULAR at 17:40

## 2021-01-26 ASSESSMENT — PAIN SCALES - GENERAL
PAINLEVEL_OUTOF10: 3
PAINLEVEL_OUTOF10: 5

## 2021-01-26 ASSESSMENT — PAIN DESCRIPTION - PAIN TYPE: TYPE: ACUTE PAIN

## 2021-01-26 ASSESSMENT — PAIN DESCRIPTION - ORIENTATION: ORIENTATION: RIGHT

## 2021-01-26 NOTE — TELEPHONE ENCOUNTER
Brief description of triage: patient states he has an obstructed kidney stone. He is having testicular, abdominal, and right flank pain. Saw provider today and had outpt CT scan. Provider called and referred to ED d/t obstruction. Calling d/t Employee [employer]. Attention Provider: Thank you for allowing me to participate in the care of your patient. The patient was connected to triage in response to symptoms provided. Please do not respond through this encounter as the response is not directed to a shared pool. Reason for Disposition   Caller has already spoken with the PCP and has no further questions.     Protocols used: NO CONTACT OR DUPLICATE CONTACT CALL-ADULT-

## 2021-01-26 NOTE — ED PROVIDER NOTES
I independently performed a history and physical on Pb Huff. All diagnostic, treatment, and disposition decisions were made by myself in conjunction with the advanced practice provider. For further details of [facility] emergency department encounter, please see JEREMY Baker's documentation. Patient presents to the emergency department complaining of flank pain and abdominal pain. Patient states symptoms have been ongoing for about 2 weeks. Reports that he had CT done concerning for ureterolithiasis. Pain is rated as 2/10. Physical exam: General: No acute distress. Moderate discomfort. Abdomen: Soft. Nontender nondistended. No rebound, guarding. No CVA tenderness. Urology consulted. Please see PA note for details.       1. Obstructed, uropathy           Saranya Walter,   01/26/21 3607

## 2021-01-26 NOTE — ED PROVIDER NOTES
[facility] Emergency Department    CHIEF COMPLAINT  Flank Pain (R sided flank pain into abd, CT done showing obst stone ) and Abdominal Pain      SHARED SERVICE VISIT  I have seen and evaluated this patient with my supervising physician, Dr. Gerda Jones. HISTORY OF PRESENT ILLNESS  Cyndi Li is a 27 y.o. male who presents to the ED complaining of 1 to 2-week history of right-sided flank pain. Was seen outpatient today and had urinalysis done. States that he also had CT showing 5 mm obstructing stone to right ureter. Patient reports that he has been controlling pain with anti-inflammatories. Currently rated at a 2-3 out of 10. No aggravating factors. Does not appear to radiate. Has had no dysuria. No fevers chills. No abdominal surgeries. Occasional nausea. No vomiting. No headache, lightheadedness, dizziness or confusion. No chest pain or shortness of breath. No other complaints, modifying factors or associated symptoms. Nursing notes reviewed.    Past Medical History:   Diagnosis Date    GERD (gastroesophageal reflux disease)     Migraine     Myoclonic disorder     pt reports last episode 6 months ago     Past Surgical History:   Procedure Laterality Date    COLONOSCOPY      TYMPANOSTOMY TUBE PLACEMENT Left 1992     Family History   Problem Relation Age of Onset    Colon Cancer Mother 48    Diabetes Father     Depression Father      Social History     Socioeconomic History    Marital status: Single     Spouse name: Not on file    Number of children: Not on file    Years of education: Not on file    Highest education level: Not on file   Occupational History    Occupation: RN   Social Needs    Financial resource strain: Not hard at all   [employer] insecurity     Worry: Not on file     Inability: Not on file    Transportation needs     Medical: No     Non-medical: No   Tobacco Use    Smoking status: Never Smoker    Smokeless tobacco: Never Used Substance and Sexual Activity    Alcohol use: Yes     Alcohol/week: 6.0 standard drinks     Types: 6 Cans of beer per week     Comment: occassionally    Drug use: No    Sexual activity: Yes     Partners: Male   Lifestyle    Physical activity     Days per week: Not on file     Minutes per session: Not on file    Stress: Not on file   Relationships    Social connections     Talks on phone: Not on file     Gets together: Not on file     Attends Sikh service: Not on file     Active member of club or organization: Not on file     Attends meetings of clubs or organizations: Not on file     Relationship status: Not on file    Intimate partner violence     Fear of current or ex partner: Not on file     Emotionally abused: Not on file     Physically abused: Not on file     Forced sexual activity: Not on file   Other Topics Concern    Not on file   Social History Narrative    ** Merged History Encounter **         ** Merged History Encounter **          No current facility-administered medications for this encounter. Current Outpatient Medications   Medication Sig Dispense Refill    valACYclovir (VALTREX) 1 g tablet Take 2 tablets by mouth 2 times daily for 1 day 4 tablet 3     Allergies   Allergen Reactions    Amoxicillin     Dye [Iodides]        REVIEW OF SYSTEMS  10 systems reviewed, pertinent positives per HPI otherwise noted to be negative    PHYSICAL EXAM  BP (!) 142/91   Pulse 82   Temp 97.8 °F (36.6 °C) (Oral)   Resp 18   SpO2 97%   GENERAL APPEARANCE: Awake and alert. Cooperative. No acute distress. HEAD: Normocephalic. Atraumatic. EYES: PERRL. EOM's grossly intact. ENT: Mucous membranes are moist.   NECK: Supple. HEART: RRR. No murmurs. LUNGS: Respirations unlabored. CTAB. Good air exchange. Speaking comfortably in full sentences. ABDOMEN: Soft. Non-distended. Mild right-sided abdominal tenderness without rigidity, guarding or rebound.   Negative Polanco's, McBurney's and Rovsing's. No fluids or ascites. No hernias or masses. Bowel sounds normal in all quadrants. Mild right CVA tenderness. EXTREMITIES: No peripheral edema. Moves all extremities equally. All extremities neurovascularly intact. SKIN: Warm and dry. No acute rashes. NEUROLOGICAL: Alert and oriented. CN's 2-12 intact. No gross facial drooping. Strength 5/5, sensation intact. PSYCHIATRIC: Normal mood and affect. RADIOLOGY  Ct Abdomen Pelvis Wo Contrast Additional Contrast? None    Result Date: 1/26/2021  EXAMINATION: CT OF THE ABDOMEN AND PELVIS WITHOUT CONTRAST 1/26/2021 1:02 pm TECHNIQUE: CT of the abdomen and pelvis was performed without the administration of intravenous contrast. Multiplanar reformatted images are provided for review. Dose modulation, iterative reconstruction, and/or weight based adjustment of the mA/kV was utilized to reduce the radiation dose to as low as reasonably achievable. COMPARISON: 04/04/2017 HISTORY: ORDERING SYSTEM PROVIDED HISTORY: Right flank pain TECHNOLOGIST PROVIDED HISTORY: Additional Contrast?->None Reason for exam:->right flank pain Reason for Exam: right flank pain. no surgery/ca. , hx urethral stricture Acuity: Acute Type of Exam: Initial FINDINGS: Lower Chest: Increased opacity at the lung bases favored to represent atelectasis. Free air is noted within the abdomen or pelvis . Organs: 5 mm calculus noted within the proximal right ureter with mild hydroureteronephrosis proximally and perinephric stranding noted. The right kidney is otherwise grossly unremarkable in appearance. The left kidney, adrenal glands, liver, gallbladder, pancreas and spleen are unremarkable in appearance. GI/Bowel: Limited noncontrast imaging of the stomach and small bowel is unremarkable in appearance. No suspicious mesenteric adenopathy or fluid collections noted. The appendix is visualized and appears within normal limits. The ileocecal valve is unremarkable.  The colon is decompressed and unremarkable in appearance. Pelvis: Urinary bladder is unremarkable with small amount of calcification noted within the prostate which is slightly more than expected for patient's stated age. Peritoneum/Retroperitoneum: Aorta is normal in caliber. Small lymph nodes within the retroperitoneum are similar to prior study, likely reactive. Bones/Soft Tissues: No acute osseous abnormality noted. 5 mm obstructing calculus within the proximal right ureter with associated mild hydroureteronephrosis. Perinephric and periureteral stranding likely reactive in nature. Superimposed infection cannot be excluded. ED COURSE  Patient received Toradol for pain, with good relief. Triage vitals stable. CBC without leukocytosis or anemia. CMP unremarkable. Urinalysis with greater than 100 white blood cells and 2+ bacteria. Red blood cells were present. Nitrite negative. Given no signs of systemic infection at this time both myself, attending and urology feel that discharge is reasonable. Patient is a registered nurse so medically trained. He at this time would like to go home and follow-up on outpatient basis. Given empiric dose of Rocephin and will be sent on oral antibiotics. Have discussed return precautions and recommendations for follow-up otherwise and he is in agreement and comfortable at discharge. A discussion was had with Mr. Lorie Saeed regarding flank pain, ED findings and recommendations for follow-up. Risk management discussed and shared decision making had with patient and/or surrogate. All questions were answered. Patient will follow up with urology group within 1 to 2 days for further evaluation/treatment. All questions answered. Patient will return to ED for new/worsening symptoms. Patient was sent home with a prescription for  Toradol and Keflex.     MDM  Results for orders placed or performed during the hospital encounter of 01/26/21   CBC Auto Differential   Result Value Ref Range    WBC 7.6 4.0 - 11.0 K/uL    RBC 5.18 4.20 - 5.90 M/uL    Hemoglobin 14.9 13.5 - 17.5 g/dL    Hematocrit 43.6 40.5 - 52.5 %    MCV 84.2 80.0 - 100.0 fL    MCH 28.8 26.0 - 34.0 pg    MCHC 34.2 31.0 - 36.0 g/dL    RDW 13.6 12.4 - 15.4 %    Platelets 059 093 - 639 K/uL    MPV 7.5 5.0 - 10.5 fL    Neutrophils % 61.9 %    Lymphocytes % 25.1 %    Monocytes % 9.5 %    Eosinophils % 3.1 %    Basophils % 0.4 %    Neutrophils Absolute 4.7 1.7 - 7.7 K/uL    Lymphocytes Absolute 1.9 1.0 - 5.1 K/uL    Monocytes Absolute 0.7 0.0 - 1.3 K/uL    Eosinophils Absolute 0.2 0.0 - 0.6 K/uL    Basophils Absolute 0.0 0.0 - 0.2 K/uL   Comprehensive Metabolic Panel   Result Value Ref Range    Sodium 138 136 - 145 mmol/L    Potassium 4.3 3.5 - 5.1 mmol/L    Chloride 103 99 - 110 mmol/L    CO2 25 21 - 32 mmol/L    Anion Gap 10 3 - 16    Glucose 90 70 - 99 mg/dL    BUN 13 7 - 20 mg/dL    CREATININE 0.8 (L) 0.9 - 1.3 mg/dL    GFR Non-African American >60 >60    GFR African American >60 >60    Calcium 9.2 8.3 - 10.6 mg/dL    Total Protein 7.5 6.4 - 8.2 g/dL    Albumin 3.8 3.4 - 5.0 g/dL    Albumin/Globulin Ratio 1.0 (L) 1.1 - 2.2    Total Bilirubin 0.3 0.0 - 1.0 mg/dL    Alkaline Phosphatase 108 40 - 129 U/L    ALT 12 10 - 40 U/L    AST 30 15 - 37 U/L    Globulin 3.7 g/dL   Urinalysis   Result Value Ref Range    Color, UA Yellow Straw/Yellow    Clarity, UA Clear Clear    Glucose, Ur Negative Negative mg/dL    Bilirubin Urine Negative Negative    Ketones, Urine Negative Negative mg/dL    Specific Gravity, UA 1.025 1.005 - 1.030    Blood, Urine MODERATE (A) Negative    pH, UA 6.0 5.0 - 8.0    Protein, UA Negative Negative mg/dL    Urobilinogen, Urine 0.2 <2.0 E.U./dL    Nitrite, Urine Negative Negative    Leukocyte Esterase, Urine SMALL (A) Negative    Microscopic Examination YES     Urine Type NotGiven    Microscopic Urinalysis   Result Value Ref Range    Hyaline Casts, UA 0-2 0 - 2 /LPF    Mucus, UA 1+ (A) None Seen /LPF    WBC, UA >100 (A) 0 - 5 /HPF RBC, UA 11-20 (A) 0 - 4 /HPF    Bacteria, UA 2+ (A) None Seen /HPF     I estimate there is LOW risk for ACUTE APPENDICITIS, BOWEL OBSTRUCTION, CHOLECYSTITIS, DIVERTICULITIS, INCARCERATED HERNIA, PANCREATITIS, or PERFORATED BOWEL or ULCER, thus I consider the discharge disposition reasonable. Also, there is no evidence or peritonitis, sepsis, or toxicity. Last Youngblood and I have discussed the diagnosis and risks, and we agree with discharging home to follow-up with their primary doctor. We also discussed returning to the Emergency Department immediately if new or worsening symptoms occur. We have discussed the symptoms which are most concerning (e.g., bloody stool, fever, changing or worsening pain, vomiting) that necessitate immediate return. FINAL Impression  1. Obstructed, uropathy      Blood pressure (!) 137/99, pulse 70, temperature 97.8 °F (36.6 °C), temperature source Oral, resp. rate 15, weight 200 lb (90.7 kg), SpO2 100 %. DISPOSITION  Patient was discharged to home in good condition.          Enoch Weems, [address]  01/26/21 1914

## 2021-01-28 LAB
ORGANISM: ABNORMAL
URINE CULTURE, ROUTINE: ABNORMAL

## 2021-01-29 NOTE — RESULT ENCOUNTER NOTE
Urine culture was positive. Patient should be prescribed Macrobid with instructions to take 800 mg tablet every 12 hours for 7 days, dispense 14 tab 0 refills.

## 2021-08-16 ENCOUNTER — HOSPITAL ENCOUNTER (OUTPATIENT)
Age: 31
Discharge: HOME | End: 2021-08-16

## 2021-08-16 ENCOUNTER — ANESTHESIA EVENT (OUTPATIENT)
Dept: ENDOSCOPY | Age: 31
End: 2021-08-16
Payer: COMMERCIAL

## 2021-08-16 PROCEDURE — U0003 INFECTIOUS AGENT DETECTION BY NUCLEIC ACID (DNA OR RNA); SEVERE ACUTE RESPIRATORY SYNDROME CORONAVIRUS 2 (SARS-COV-2) (CORONAVIRUS DISEASE [COVID-19]), AMPLIFIED PROBE TECHNIQUE, MAKING USE OF HIGH THROUGHPUT TECHNOLOGIES AS DESCRIBED BY CMS-2020-01-R: HCPCS

## 2021-08-16 PROCEDURE — U0005 INFEC AGEN DETEC AMPLI PROBE: HCPCS

## 2021-08-16 NOTE — ANESTHESIA PRE PROCEDURE
Department of Anesthesiology  Preprocedure Note       Name:  Navin Raza   Age:  32 y.o.  :  1990                                          MRN:  [id number]         Date:  2021      Surgeon: Indigo Shaikh):  Baylee Morrissey MD    Procedure: Procedure(s):  COLON W/ANES. (10:30)    Medications prior to admission:   Prior to Admission medications    Medication Sig Start Date End Date Taking? Authorizing Provider   famciclovir [facility]) 250 MG tablet Take 1 tablet by mouth 2 times daily 3/19/21   GINA Macias - CNP   ketorolac (TORADOL) 10 MG tablet Take 1 tablet by mouth every 6 hours as needed for Pain 21  JEREMY Garcia       Current medications:    No current facility-administered medications for this encounter. Current Outpatient Medications   Medication Sig Dispense Refill    famciclovir (FAMVIR) 250 MG tablet Take 1 tablet by mouth 2 times daily 60 tablet 3    ketorolac (TORADOL) 10 MG tablet Take 1 tablet by mouth every 6 hours as needed for Pain 20 tablet 0       Allergies: Allergies   Allergen Reactions    Amoxicillin     Dye [Iodides]        Problem List:    Patient Active Problem List   Diagnosis Code    Tachycardia R00.0    Family history of colon cancer in mother [de-identified]    History of urethral stricture Z87.448    Chronic nonintractable headache R51.9, G89.29       Past Medical History:        Diagnosis Date    GERD (gastroesophageal reflux disease)     Migraine     Myoclonic disorder     pt reports last episode 6 months ago       Past Surgical History:        Procedure Laterality Date    COLONOSCOPY      TYMPANOSTOMY TUBE PLACEMENT Left        Social History:    Social History     Tobacco Use    Smoking status: Never Smoker    Smokeless tobacco: Never Used   Substance Use Topics    Alcohol use:  Yes     Alcohol/week: 6.0 standard drinks     Types: 6 Cans of beer per week     Comment: occassionally                                Counseling given: Not Answered      Vital Signs (Current): There were no vitals filed for this visit. BP Readings from Last 3 Encounters:   01/26/21 (!) 137/99   01/26/21 121/67   03/04/20 122/86       NPO Status:                                                                                 BMI:   Wt Readings from Last 3 Encounters:   01/26/21 200 lb (90.7 kg)   01/26/21 207 lb (93.9 kg)   03/04/20 197 lb 1.5 oz (89.4 kg)     There is no height or weight on file to calculate BMI.    CBC:   Lab Results   Component Value Date    WBC 7.6 01/26/2021    RBC 5.18 01/26/2021    HGB 14.9 01/26/2021    HCT 43.6 01/26/2021    MCV 84.2 01/26/2021    RDW 13.6 01/26/2021     01/26/2021       CMP:   Lab Results   Component Value Date     01/26/2021    K 4.3 01/26/2021     01/26/2021    CO2 25 01/26/2021    BUN 13 01/26/2021    CREATININE 0.8 01/26/2021    GFRAA >60 01/26/2021    AGRATIO 1.0 01/26/2021    LABGLOM >60 01/26/2021    GLUCOSE 90 01/26/2021    PROT 7.5 01/26/2021    CALCIUM 9.2 01/26/2021    BILITOT 0.3 01/26/2021    ALKPHOS 108 01/26/2021    AST 30 01/26/2021    ALT 12 01/26/2021       POC Tests: No results for input(s): POCGLU, POCNA, POCK, POCCL, POCBUN, POCHEMO, POCHCT in the last 72 hours.     Coags: No results found for: PROTIME, INR, APTT    HCG (If Applicable): No results found for: PREGTESTUR, PREGSERUM, HCG, HCGQUANT     ABGs: No results found for: PHART, PO2ART, ZOB9TFH, FLY2HHF, BEART, [id number]     Type & Screen (If Applicable):  No results found for: LABABO, LABRH    Drug/Infectious Status (If Applicable):  No results found for: HIV, HEPCAB    COVID-19 Screening (If Applicable):   Lab Results   Component Value Date    COVID19 Not Detected 11/03/2020           Anesthesia Evaluation  Patient summary reviewed and Nursing notes reviewed no history of anesthetic complications:   Airway: Mallampati: III     Neck ROM: full   Dental:          Pulmonary:Negative Pulmonary ROS and normal exam                               Cardiovascular:Negative CV ROS                      Neuro/Psych:   Negative Neuro/Psych ROS  (+) headaches:,             GI/Hepatic/Renal: Neg GI/Hepatic/Renal ROS  (+) GERD: well controlled,      (-) hiatal hernia       Endo/Other: Negative Endo/Other ROS                    Abdominal:             Vascular: Other Findings:           Anesthesia Plan      general     ASA 2     (I discussed with the patient the risks and benefits of PIV, general anesthesia, IV Narcotics, PACU. All questions were answered the patient agrees with the plan and wishes to proceed.  )  Induction: intravenous. Pre-Operative Diagnosis: HX COLON POYLPS    32 y.o.   BMI:  Body mass index is 33.89 kg/m². Vitals:    08/18/21 0940   BP: (!) 134/91   Pulse: 91   Resp: 16   Temp: 97.8 °F (36.6 °C)   TempSrc: Temporal   SpO2: 95%   Weight: 210 lb (95.3 kg)   Height: 5' 6\" (1.676 m)       Allergies   Allergen Reactions    Amoxicillin     Dye [Iodides]        Social History     Tobacco Use    Smoking status: Never Smoker    Smokeless tobacco: Never Used   Substance Use Topics    Alcohol use:  Yes     Alcohol/week: 6.0 standard drinks     Types: 6 Cans of beer per week     Comment: occassionally       LABS:    CBC  Lab Results   Component Value Date/Time    WBC 7.6 01/26/2021 04:28 PM    HGB 14.9 01/26/2021 04:28 PM    HCT 43.6 01/26/2021 04:28 PM     01/26/2021 04:28 PM     RENAL  Lab Results   Component Value Date/Time     01/26/2021 04:28 PM    K 4.3 01/26/2021 04:28 PM     01/26/2021 04:28 PM    CO2 25 01/26/2021 04:28 PM    BUN 13 01/26/2021 04:28 PM    CREATININE 0.8 (L) 01/26/2021 04:28 PM    GLUCOSE 90 01/26/2021 04:28 PM     COAGS  No results found for: PROTIME, INR, APTT      Atilio Gautam MD   8/16/2021

## 2021-08-17 LAB — SARS-COV-2: NOT DETECTED

## 2021-08-18 ENCOUNTER — ANESTHESIA (OUTPATIENT)
Dept: ENDOSCOPY | Age: 31
End: 2021-08-18
Payer: COMMERCIAL

## 2021-08-18 ENCOUNTER — HOSPITAL ENCOUNTER (OUTPATIENT)
Age: 31
Setting detail: OUTPATIENT SURGERY
Discharge: HOME | End: 2021-08-18
Attending: INTERNAL MEDICINE | Admitting: INTERNAL MEDICINE
Payer: COMMERCIAL

## 2021-08-18 VITALS
SYSTOLIC BLOOD PRESSURE: 141 MMHG | DIASTOLIC BLOOD PRESSURE: 89 MMHG | RESPIRATION RATE: 5 BRPM | OXYGEN SATURATION: 97 %

## 2021-08-18 VITALS
TEMPERATURE: 97.9 F | OXYGEN SATURATION: 96 % | RESPIRATION RATE: 20 BRPM | SYSTOLIC BLOOD PRESSURE: 135 MMHG | HEART RATE: 81 BPM | HEIGHT: 66 IN | WEIGHT: 210 LBS | BODY MASS INDEX: 33.75 KG/M2 | DIASTOLIC BLOOD PRESSURE: 96 MMHG

## 2021-08-18 PROCEDURE — 88305 TISSUE EXAM BY PATHOLOGIST: CPT

## 2021-08-18 PROCEDURE — 3700000000 HC ANESTHESIA ATTENDED CARE: Performed by: INTERNAL MEDICINE

## 2021-08-18 PROCEDURE — 3700000001 HC ADD 15 MINUTES (ANESTHESIA): Performed by: INTERNAL MEDICINE

## 2021-08-18 PROCEDURE — 6360000002 HC RX W HCPCS

## 2021-08-18 PROCEDURE — 2709999900 HC NON-CHARGEABLE SUPPLY: Performed by: INTERNAL MEDICINE

## 2021-08-18 PROCEDURE — 2500000003 HC RX 250 WO HCPCS

## 2021-08-18 PROCEDURE — 7100000011 HC PHASE II RECOVERY - ADDTL 15 MIN: Performed by: INTERNAL MEDICINE

## 2021-08-18 PROCEDURE — 3609010600 HC COLONOSCOPY POLYPECTOMY SNARE/COLD BIOPSY: Performed by: INTERNAL MEDICINE

## 2021-08-18 PROCEDURE — 7100000010 HC PHASE II RECOVERY - FIRST 15 MIN: Performed by: INTERNAL MEDICINE

## 2021-08-18 RX ORDER — PROPOFOL 10 MG/ML
INJECTION, EMULSION INTRAVENOUS PRN
Status: DISCONTINUED | OUTPATIENT
Start: 2021-08-18 | End: 2021-08-18 | Stop reason: SDUPTHER

## 2021-08-18 RX ORDER — SODIUM CHLORIDE, SODIUM LACTATE, POTASSIUM CHLORIDE, CALCIUM CHLORIDE 600; 310; 30; 20 MG/100ML; MG/100ML; MG/100ML; MG/100ML
INJECTION, SOLUTION INTRAVENOUS CONTINUOUS
Status: DISCONTINUED | OUTPATIENT
Start: 2021-08-18 | End: 2021-08-18 | Stop reason: HOSPADM

## 2021-08-18 RX ORDER — LIDOCAINE HYDROCHLORIDE 20 MG/ML
INJECTION, SOLUTION INFILTRATION; PERINEURAL PRN
Status: DISCONTINUED | OUTPATIENT
Start: 2021-08-18 | End: 2021-08-18 | Stop reason: SDUPTHER

## 2021-08-18 RX ADMIN — LIDOCAINE HYDROCHLORIDE 100 MG: 20 INJECTION, SOLUTION INFILTRATION; PERINEURAL at 11:07

## 2021-08-18 RX ADMIN — PROPOFOL 400 MG: 10 INJECTION, EMULSION INTRAVENOUS at 11:07

## 2021-08-18 ASSESSMENT — PAIN - FUNCTIONAL ASSESSMENT: PAIN_FUNCTIONAL_ASSESSMENT: 0-10

## 2021-08-18 NOTE — H&P
History and Physical / Pre-Sedation Assessment    Patient:  Grace Holcomb   :   1990     Intended Procedure:  Colonoscopy    HPI: 32year old male with history of colon polyps, and family history of colon cancer presents for surveillance colonoscopy    Past Medical History:   Diagnosis Date    GERD (gastroesophageal reflux disease)     Migraine     Myoclonic disorder     pt reports last episode 6 months ago     Past Surgical History:   Procedure Laterality Date    COLONOSCOPY  2017    Colon Polyp; Family history of colon cancer    TYMPANOSTOMY TUBE PLACEMENT Left        Medications reviewed  Prior to Admission medications    Medication Sig Start Date End Date Taking? Authorizing Provider   famciclovir [facility]) 250 MG tablet Take 1 tablet by mouth 2 times daily 3/19/21  Yes GINA Coffman - CNP   ketorolac (TORADOL) 10 MG tablet Take 1 tablet by mouth every 6 hours as needed for Pain 21  JEREMY Kumar        Allergies: Allergies   Allergen Reactions    Amoxicillin     Dye [Iodides]        Nurses notes reviewed and agreed. Physical Exam:  Vital Signs: BP (!) 134/91   Pulse 91   Temp 97.8 °F (36.6 °C) (Temporal)   Resp 16   Ht 5' 6\" (1.676 m)   Wt 210 lb (95.3 kg)   SpO2 95%   BMI 33.89 kg/m²    Airway: Mallampati: II (soft palate, uvula, fauces visible)  Pulmonary:Normal  Cardiac:Normal  Abdomen:Normal    Pre-Procedure Assessment / Plan:  ASA: Class 3 - A patient with severe systemic disease that limits activity but is not incapacitating  Level of Sedation Plan: Moderate sedation  Post Procedure plan: Return to same level of care    I assessed the patient and find that the patient is in satisfactory condition to proceed with the planned procedure and sedation plan. I have explained the risk, benefits, and alternatives to the procedure; the patient understands and agrees to proceed.        Vlad Ramirez MD  2021

## 2021-08-18 NOTE — ANESTHESIA POSTPROCEDURE EVALUATION
Department of Anesthesiology  Postprocedure Note    Patient: Jos Marroquin  MRN: [id number]  YOB: 1990  Date of evaluation: 8/18/2021  Time:  1:44 PM     Procedure Summary     Date: 08/18/21 Room / Location: [place] [address] / [facility]    Anesthesia Start: 1101 Anesthesia Stop: 3718    Procedure: COLONOSCOPY POLYPECTOMY SNARE/COLD BIOPSY (N/A ) Diagnosis: (HX COLON POYLPS)    Surgeons: Thea Carreon MD Responsible Provider: Macey Muniz MD    Anesthesia Type: general ASA Status: 2          Anesthesia Type: general    Kodak Phase I: Kodak Score: 10    Kodak Phase II: Kodak Score: 10    Last vitals: Reviewed and per EMR flowsheets.        Anesthesia Post Evaluation    Comments: Postoperative Anesthesia Note    Name:    Jos Marroquin  MRN:      [id number]    Patient Vitals in the past 12 hrs:  08/18/21 1203, BP:(!) 135/96, Temp:97.9 °F (36.6 °C), Temp src:Infrared, Pulse:81, Resp:20, SpO2:96 %  08/18/21 1126, BP:99/73, Temp:98.1 °F (36.7 °C), Temp src:Infrared, Pulse:80, Resp:18, SpO2:94 %  08/18/21 0940, BP:(!) 134/91, Temp:97.8 °F (36.6 °C), Temp src:Temporal, Pulse:91, Resp:16, SpO2:95 %, Height:5' 6\" (1.676 m), Weight:210 lb (95.3 kg)     LABS:    CBC  Lab Results       Component                Value               Date/Time                  WBC                      7.6                 01/26/2021 04:28 PM        HGB                      14.9                01/26/2021 04:28 PM        HCT                      43.6                01/26/2021 04:28 PM        PLT                      222                 01/26/2021 04:28 PM   RENAL  Lab Results       Component                Value               Date/Time                  NA                       138                 01/26/2021 04:28 PM        K                        4.3                 01/26/2021 04:28 PM        CL                       103                 01/26/2021 04:28 PM        CO2                      25 01/26/2021 04:28 PM        BUN                      13                  01/26/2021 04:28 PM        CREATININE               0.8 (L)             01/26/2021 04:28 PM        GLUCOSE                  90                  01/26/2021 04:28 PM   COAGS  No results found for: PROTIME, INR, APTT    Intake & Output:  @10MRGR@    Nausea & Vomiting:  No    Level of Consciousness:  Awake    Pain Assessment:  Adequate analgesia    Anesthesia Complications:  No apparent anesthetic complications    SUMMARY      Vital signs stable  OK to discharge from Stage I post anesthesia care.   Care transferred from Anesthesiology department on discharge from perioperative area

## 2021-08-18 NOTE — BRIEF OP NOTE
Brief Postoperative Note      Patient: Carley Fu  YOB: 1990  MRN: [id number]    Date of Procedure: 8/18/2021    Pre-Op Diagnosis: HX COLON POYLPS    Post-Op Diagnosis: colon polyp, external hemorrhoids       Procedure(s):  COLONOSCOPY POLYPECTOMY SNARE/COLD BIOPSY    Surgeon(s):  Natasha Templeton MD    Assistant:  * No surgical staff found *    Anesthesia: Monitor Anesthesia Care    Estimated Blood Loss (mL): Minimal    Complications: None    Specimens:   ID Type Source Tests Collected by Time Destination   A :  Tissue Tissue SURGICAL PATHOLOGY Natasha Templeton MD 8/18/2021 1119        Implants:  * No implants in log *      Drains: * No LDAs found *    Findings: colon polyp, external hemorrhoids    Electronically signed by Natasha Templeton MD on 8/18/2021 at 11:36 AM

## 2021-08-18 NOTE — OP NOTE
[address]                 [address] [address]                                OPERATIVE REPORT    PATIENT NAME: Carlito Cooney                     :        1990  MED REC NO:   [id number]                          ROOM:  ACCOUNT NO:   [de-identified]                           ADMIT DATE: 2021  PROVIDER:     Slime Oconnor MD    DATE OF PROCEDURE:  2021    PRE-PROCEDURE DIAGNOSES:  1. Family history of colon cancer. 2.  Personal history of colon polyps. 3.  Rectal bleeding. PROCEDURE:  Colonoscopy to the cecum with snare polypectomy. POSTPROCEDURE DIAGNOSES:  1. Single ascending colon polyp. 2.  Internal and external hemorrhoids. 3.  Otherwise normal colon to cecum. PROCEDURE INDICATIONS:  A 27-year-old male with a history of colon  polyps and family history of colon cancer, presents for surveillance  colonoscopy. He does mention occasional intermittent episodes of rectal  bleeding as well. MEDICATIONS:  MAC per Anesthesia. PROCEDURE IN DETAIL:  Informed consent was obtained after discussing  risks, benefits, and alternatives. Full history and physical was  performed. The patient was classified as ASA class III. Medications  were given by Anesthesia. Cardiopulmonary status was continuously  monitored throughout the procedure. The patient was placed in left  lateral decubitus position. Once adequately sedated, a standard  pediatric colonoscope was inserted in the anus and advanced to the cecum  identified by landmarks of appendiceal orifice and IC valve. The  terminal ileum was briefly intubated for visualization. Entire mucosa  of the cecum, ascending colon, transverse colon, descending colon,  sigmoid colon, and rectum were examined carefully during withdrawal.   The patient tolerated the procedure well without any difficulties. The  colon prep was good.     FINDINGS:  RECTUM:  The digital rectal exam was normal.  There were large external  hemorrhoids. No evidence of active bleeding. COLON:  The visualized terminal ileum appeared normal.  There was a  single 6-mm sessile polyp in the ascending colon. This was completely  resected and retrieved using snare polypectomy method. Remaining  examined colon mucosa appeared normal and healthy without any evidence  of inflammation, ulcers, pseudomembranes, polyps or masses. Retroflexed  view of the rectum showed small internal hemorrhoids. SUMMARY:  1.  Large external hemorrhoids. 2.  Small internal hemorrhoids. 3.  Ascending colon polyp. 4.  Otherwise normal colon to cecum. RECOMMENDATIONS:  1. Discharge the patient to home when standard parameters are met. 2.  Await pathology results. 3.  Repeat colonoscopy in 5 years. 4.  Follow up with colorectal surgeon, Dr. Venkatesh Gutierrez for management of  external hemorrhoids. 5.  Encourage high-fiber diet. 6.  Follow up as needed.     EBL: <5mL    Fox Bucio MD    D: 08/18/2021 11:36:22       T: 08/18/2021 11:38:35     [email or web address]  Job#: [id number]     Doc#: [id number]    CC:  MD Mellisa Mcleod, CNP

## 2021-09-13 LAB
CHOLESTEROL, TOTAL: 163 MG/DL (ref 0–199)
GLUCOSE BLD-MCNC: 80 MG/DL (ref 70–99)
HDLC SERPL-MCNC: 38 MG/DL (ref 40–60)
LDL CHOLESTEROL CALCULATED: 99 MG/DL
TRIGL SERPL-MCNC: 129 MG/DL (ref 0–150)

## 2022-03-03 ENCOUNTER — HOSPITAL ENCOUNTER (OUTPATIENT)
Age: 32
Discharge: HOME | End: 2022-03-03
Payer: COMMERCIAL

## 2022-03-03 DIAGNOSIS — R19.7 DIARRHEA, UNSPECIFIED TYPE: ICD-10-CM

## 2022-03-03 LAB — C DIFF TOXIN/ANTIGEN: NORMAL

## 2022-03-03 PROCEDURE — 87505 NFCT AGENT DETECTION GI: CPT

## 2022-03-03 PROCEDURE — 87449 NOS EACH ORGANISM AG IA: CPT

## 2022-03-03 PROCEDURE — 87324 CLOSTRIDIUM AG IA: CPT

## 2022-03-04 LAB — GI BACTERIAL PATHOGENS BY PCR: NORMAL

## 2022-12-09 ENCOUNTER — HOSPITAL ENCOUNTER (EMERGENCY)
Age: 32
Discharge: HOME | End: 2022-12-09
Attending: EMERGENCY MEDICINE
Payer: COMMERCIAL

## 2022-12-09 VITALS
DIASTOLIC BLOOD PRESSURE: 95 MMHG | TEMPERATURE: 98.2 F | HEART RATE: 94 BPM | OXYGEN SATURATION: 94 % | SYSTOLIC BLOOD PRESSURE: 133 MMHG | RESPIRATION RATE: 18 BRPM

## 2022-12-09 DIAGNOSIS — M62.838 TRAPEZIUS MUSCLE SPASM: Primary | ICD-10-CM

## 2022-12-09 PROCEDURE — 96372 THER/PROPH/DIAG INJ SC/IM: CPT

## 2022-12-09 PROCEDURE — 99284 EMERGENCY DEPT VISIT MOD MDM: CPT

## 2022-12-09 PROCEDURE — 6360000002 HC RX W HCPCS: Performed by: EMERGENCY MEDICINE

## 2022-12-09 RX ORDER — INDOMETHACIN 25 MG/1
25 CAPSULE ORAL 3 TIMES DAILY PRN
Qty: 20 CAPSULE | Refills: 0 | Status: SHIPPED | OUTPATIENT
Start: 2022-12-09

## 2022-12-09 RX ORDER — CYCLOBENZAPRINE HCL 10 MG
10 TABLET ORAL ONCE
Status: DISCONTINUED | OUTPATIENT
Start: 2022-12-09 | End: 2022-12-09

## 2022-12-09 RX ORDER — CYCLOBENZAPRINE HCL 10 MG
10 TABLET ORAL 3 TIMES DAILY PRN
Qty: 15 TABLET | Refills: 0 | Status: SHIPPED | OUTPATIENT
Start: 2022-12-09

## 2022-12-09 RX ORDER — KETOROLAC TROMETHAMINE 30 MG/ML
30 INJECTION, SOLUTION INTRAMUSCULAR; INTRAVENOUS ONCE
Status: COMPLETED | OUTPATIENT
Start: 2022-12-09 | End: 2022-12-09

## 2022-12-09 RX ADMIN — KETOROLAC TROMETHAMINE 30 MG: 30 INJECTION, SOLUTION INTRAMUSCULAR; INTRAVENOUS at 01:56

## 2022-12-09 ASSESSMENT — ENCOUNTER SYMPTOMS
EYES NEGATIVE: 1
RESPIRATORY NEGATIVE: 1
GASTROINTESTINAL NEGATIVE: 1

## 2022-12-09 NOTE — DISCHARGE INSTRUCTIONS
Your symptoms are most likely due to spasm of your trapezius muscle which is the muscle of the outside back part of the neck and goes into the back, compressing the nerves that go into your arm. Take indomethacin and Flexeril as needed for pain and spasm. Use heat over sore areas of the muscle. Follow-up with your primary care provider for repeat evaluation and further testing if symptoms continue.

## 2022-12-09 NOTE — ED PROVIDER NOTES
[address] [facility]          ATTENDING PHYSICIAN NOTE       Date of evaluation: 12/9/2022    Chief Complaint     Neck Pain (Pt. Presents to ED with c/o neck pain since Tuesday. Today he was plating with his dog when he had increased pain in his neck and numbness/pain down left upper extremity. )      History of Present Illness     Demarco Denney is a 28 y.o. male who presents to the emergency department complaining of neck pain and left arm pain. Patient states he has been having pain to the left side of his neck for the last several days and tonight was wrestling with his dog and developed sharp pain that radiated into the left arm. He states he had some tingling in the arm as well but that is since resolved. He denies any weakness of the arm. He denies any headache. He denies any loss control of his bowel or bladder. He did try Tylenol which did not help with his symptoms. Review of Systems     Review of Systems   Constitutional: Negative. HENT: Negative. Eyes: Negative. Respiratory: Negative. Cardiovascular: Negative. Gastrointestinal: Negative. Genitourinary: Negative. Musculoskeletal:  Positive for myalgias and neck pain. Neurological: Negative. All other systems reviewed and are negative. Past Medical, Surgical, Family, and Social History     He has a past medical history of GERD (gastroesophageal reflux disease), Migraine, and Myoclonic disorder. He has a past surgical history that includes Tympanostomy tube placement (Left, 1992); other surgical history (08/18/2021); Colonoscopy (2017); Colonoscopy (08/18/2021); and Colonoscopy (N/A, 8/18/2021). His family history includes Colon Cancer (age of onset: 48) in his mother; Depression in his father; Diabetes in his father. He reports that he has never smoked. He has never used smokeless tobacco. He reports current alcohol use of about 6.0 standard drinks per week.  He reports that he does not use drugs. Medications     Discharge Medication List as of 12/9/2022  2:01 AM        CONTINUE these medications which have NOT CHANGED    Details   famciclovir (FAMVIR) 250 MG tablet Take 1 tablet by mouth 2 times daily, Disp-60 tablet, R-3Normal             Allergies     He is allergic to amoxicillin and dye [iodides]. Physical Exam     INITIAL VITALS: BP: (!) 133/95, Temp: 98.2 °F (36.8 °C), Heart Rate: 94, Resp: 18, SpO2: 94 %   Physical Exam  Vitals and nursing note reviewed. Constitutional:       General: He is not in acute distress. Neck:      Comments: Tender to palpation along the left trapezius musculature. No midline tenderness or step-offs noted. Spurling's test is negative indicating this is unlikely to be secondary to disc herniation and neuroforaminal narrowing. Imaging is not indicated due to the patient's lack of trauma and lack of neurologic deficits. Patient's symptoms are most likely secondary to mild cervical radiculopathy from trapezius muscle spasm. Patient will be placed on anti-inflammatories and muscle relaxants. He will be instructed to follow-up with his primary care provider for repeat evaluation if symptoms continue Spurling's test is negative. I feel  Cardiovascular:      Pulses: Normal pulses. Musculoskeletal:      Cervical back: Tenderness present. Muscular tenderness present. No spinous process tenderness. Comments: No reproducible tenderness to the left upper or lower arm. Patient is full range of motion of the elbow and shoulder joints. Neurological:      Mental Status: He is alert. Comments: Fine touch sensation is intact to the left upper extremity. Median, radial, and ulnar motor and sensory are intact. Spurling's test is negative.        Diagnostic Results     RECENT VITALS:  BP: (!) 133/95,Temp: 98.2 °F (36.8 °C), Heart Rate: 94, Resp: 18, SpO2: 94 %     Procedures     N/A    ED Course     Nursing Notes, Past Medical Hx, Past Surgical Hx, Social Hx,Allergies, and Family Hx were reviewed. patient was given the following medications:  Orders Placed This Encounter   Medications    ketorolac (TORADOL) injection 30 mg    indomethacin (INDOCIN) 25 MG capsule     Sig: Take 1 capsule by mouth 3 times daily as needed for Pain     Dispense:  20 capsule     Refill:  0    DISCONTD: cyclobenzaprine (FLEXERIL) tablet 10 mg    cyclobenzaprine (FLEXERIL) 10 MG tablet     Sig: Take 1 tablet by mouth 3 times daily as needed for Muscle spasms     Dispense:  15 tablet     Refill:  0       CONSULTS:  None    MEDICAL DECISIONMAKING / ASSESSMENT / PLAN     Kacy Salmeron is a 28 y.o. male presents complaining of left-sided neck pain for the last several days with pain now radiating into the left arm. Patient is hemodynamically stable. He has no neurovascular deficits on exam.  He does have reproducible tenderness of the trapezius muscle with no significant tenderness over the arm itself. There is no midline cervical spine tenderness. Spurling's test is negative. Patient's symptoms are most likely due to trapezius strain causing cervical radiculopathy. I have low suspicion for spine abnormality based on patient's lack of midline tenderness, negative Spurling's test, and lack of neurologic deficits so do not feel that imaging is indicated. Patient be treated symptomatically with anti-inflammatories and muscle relaxants. He will be instructed to follow-up with his primary care provider for repeat evaluation if symptoms continue. Clinical Impression     1.  Trapezius muscle spasm        Disposition     PATIENT REFERRED TO:  GINA Castellon - CNP  [address] [facility]  [phone]          DISCHARGE MEDICATIONS:  Discharge Medication List as of 12/9/2022  2:01 AM        START taking these medications    Details   indomethacin (INDOCIN) 25 MG capsule Take 1 capsule by mouth 3 times daily as needed for Pain, Disp-20 capsule, R-0Normal cyclobenzaprine (FLEXERIL) 10 MG tablet Take 1 tablet by mouth 3 times daily as needed for Muscle spasms, Disp-15 tablet, R-0Normal             DISPOSITION Decision To Discharge 12/09/2022 02:00:55 AM         Patricio Zhang MD  12/09/22 1029

## 2024-05-09 SDOH — HEALTH STABILITY: PHYSICAL HEALTH: ON AVERAGE, HOW MANY DAYS PER WEEK DO YOU ENGAGE IN MODERATE TO STRENUOUS EXERCISE (LIKE A BRISK WALK)?: 3 DAYS

## 2024-05-09 SDOH — HEALTH STABILITY: PHYSICAL HEALTH: ON AVERAGE, HOW MANY MINUTES DO YOU ENGAGE IN EXERCISE AT THIS LEVEL?: 120 MIN

## 2024-05-10 ENCOUNTER — OFFICE VISIT (OUTPATIENT)
Dept: FAMILY MEDICINE CLINIC | Age: 34
End: 2024-05-10
Payer: COMMERCIAL

## 2024-05-10 VITALS
DIASTOLIC BLOOD PRESSURE: 72 MMHG | HEIGHT: 65 IN | HEART RATE: 83 BPM | OXYGEN SATURATION: 98 % | BODY MASS INDEX: 40.48 KG/M2 | SYSTOLIC BLOOD PRESSURE: 124 MMHG | WEIGHT: 243 LBS

## 2024-05-10 PROBLEM — G89.29 CHRONIC NONINTRACTABLE HEADACHE: Status: RESOLVED | Noted: 2017-06-14 | Resolved: 2024-05-10

## 2024-05-10 PROBLEM — A53.9 SYPHILIS: Status: ACTIVE | Noted: 2019-09-17

## 2024-05-10 PROBLEM — A53.9 SYPHILIS: Status: RESOLVED | Noted: 2019-09-17 | Resolved: 2024-05-10

## 2024-05-10 PROBLEM — R51.9 CHRONIC NONINTRACTABLE HEADACHE: Status: RESOLVED | Noted: 2017-06-14 | Resolved: 2024-05-10

## 2024-05-10 PROCEDURE — 99213 OFFICE O/P EST LOW 20 MIN: CPT | Performed by: STUDENT IN AN ORGANIZED HEALTH CARE EDUCATION/TRAINING PROGRAM

## 2024-05-10 RX ORDER — TIRZEPATIDE 2.5 MG/.5ML
2.5 INJECTION, SOLUTION SUBCUTANEOUS WEEKLY
Qty: 2 ML | Refills: 0 | Status: SHIPPED | OUTPATIENT
Start: 2024-05-10

## 2024-05-10 NOTE — PROGRESS NOTES
[facility]  Establish care visit   5/10/2024    Kain Bailey (:  1990) is a 34 y.o. male, here to establish care.    Chief Complaint   Patient presents with    [facility]    Discuss Medications        ASSESSMENT/ PLAN  1. BMI 40.0-44.9, adult (HCC)  Chronic.  Obesity is uncontrolled.  Patient has attempted multiple diet options in the past.  Will prescribe as adjuvent to weight loss efforts at this time.   - Tirzepatide (MOUNJARO) 2.5 MG/0.5ML SOPN SC injection; Inject 0.5 mLs into the skin once a week  Dispense: 2 mL; Refill: 0       No follow-ups on file.    HPI  Patient is a 34-year-old male, who presents to clinic to establish care.  Patient is originally from [place] and currently lives in [place].  His partner lives with him and they are considering building a new house shortly.  Patient's previous primary care provider is a nurse practitioner that he saw a couple of days ago.  Patient is hoping to get on an non-insulin injectable medication for weight loss and the practice where he was previously being seen as a patient does not send medications to the compound pharmacy typically according to the patient.  Patient has attempted multiple different diets in the past and is hoping to get on Mounjaro 2.5 mg via compound pharmacy at this time.  He recently completed his well adult lab work and physical exam.    ROS  Review of Systems   All other systems reviewed and are negative.       HISTORIES  Current Outpatient Medications on File Prior to Visit   Medication Sig Dispense Refill    famciclovir (FAMVIR) 250 MG tablet Take 1 tablet by mouth 2 times daily 60 tablet 3     No current facility-administered medications on file prior to visit.        Allergies   Allergen Reactions    Iodine Swelling    Penicillins Hives and Other (See Comments)     Other Reaction(s): Unknown    Other reaction(s): Other (See Comments)   \"I don't know...but I had problems as a child with

## 2024-06-13 RX ORDER — TIRZEPATIDE 5 MG/.5ML
5 INJECTION, SOLUTION SUBCUTANEOUS WEEKLY
Qty: 2 ML | Refills: 0 | Status: SHIPPED | OUTPATIENT
Start: 2024-06-13 | End: 2024-07-16

## 2024-06-13 RX ORDER — TIRZEPATIDE 2.5 MG/.5ML
2.5 INJECTION, SOLUTION SUBCUTANEOUS WEEKLY
Qty: 2 ML | Refills: 0 | Status: CANCELLED | OUTPATIENT
Start: 2024-06-13

## 2024-07-16 ENCOUNTER — TELEPHONE (OUTPATIENT)
Dept: FAMILY MEDICINE CLINIC | Age: 34
End: 2024-07-16

## 2024-07-16 NOTE — TELEPHONE ENCOUNTER
Mery [facility] Dr OQUENDO contacted the office in regards to patents Rx for Mounjaro 7.5mg injection. They need the ICD-10 for this RX, please advise mery [phone]

## 2024-07-17 NOTE — TELEPHONE ENCOUNTER
Submitted PA for Mounjaro 7.5MG/0.5ML pen-injectors   Via CMM Key: [id number]  STATUS: PENDING.    Follow up done daily; if no decision with in three days we will refax.  If another three days goes by with no decision will call the insurance for status.

## 2024-08-16 RX ORDER — TIRZEPATIDE 7.5 MG/.5ML
7.5 INJECTION, SOLUTION SUBCUTANEOUS WEEKLY
Qty: 2 ML | Refills: 0 | Status: SHIPPED | OUTPATIENT
Start: 2024-08-16

## 2024-08-16 NOTE — TELEPHONE ENCOUNTER
Last Office Visit  -  05/10/2024  Next Office Visit  -  n/a    Last Filled  -    Last UDS -    Contract -

## 2024-10-11 RX ORDER — TIRZEPATIDE 7.5 MG/.5ML
7.5 INJECTION, SOLUTION SUBCUTANEOUS WEEKLY
Qty: 2 ML | Refills: 0 | Status: SHIPPED | OUTPATIENT
Start: 2024-10-11

## 2024-10-11 NOTE — TELEPHONE ENCOUNTER
Last Office Visit  -  5/10/24  Next Office Visit  -  n/a    Last Filled  -  8//16/24  Last UDS -    Contract -      Patient comment: Please send a refill for the next dose. I would like to keep my dose the same for this next month. Thanks!

## 2024-10-15 RX ORDER — SEMAGLUTIDE 1.34 MG/ML
1 INJECTION, SOLUTION SUBCUTANEOUS
Qty: 3 ML | Refills: 1 | Status: SHIPPED | OUTPATIENT
Start: 2024-10-15

## 2025-04-23 SDOH — ECONOMIC STABILITY: FOOD INSECURITY: WITHIN THE PAST 12 MONTHS, YOU WORRIED THAT YOUR FOOD WOULD RUN OUT BEFORE YOU GOT MONEY TO BUY MORE.: NEVER TRUE

## 2025-04-23 SDOH — ECONOMIC STABILITY: INCOME INSECURITY: IN THE LAST 12 MONTHS, WAS THERE A TIME WHEN YOU WERE NOT ABLE TO PAY THE MORTGAGE OR RENT ON TIME?: NO

## 2025-04-23 SDOH — ECONOMIC STABILITY: FOOD INSECURITY: WITHIN THE PAST 12 MONTHS, THE FOOD YOU BOUGHT JUST DIDN'T LAST AND YOU DIDN'T HAVE MONEY TO GET MORE.: NEVER TRUE

## 2025-04-23 SDOH — ECONOMIC STABILITY: TRANSPORTATION INSECURITY
IN THE PAST 12 MONTHS, HAS LACK OF TRANSPORTATION KEPT YOU FROM MEETINGS, WORK, OR FROM GETTING THINGS NEEDED FOR DAILY LIVING?: NO

## 2025-04-23 SDOH — ECONOMIC STABILITY: TRANSPORTATION INSECURITY
IN THE PAST 12 MONTHS, HAS THE LACK OF TRANSPORTATION KEPT YOU FROM MEDICAL APPOINTMENTS OR FROM GETTING MEDICATIONS?: NO

## 2025-04-23 ASSESSMENT — PATIENT HEALTH QUESTIONNAIRE - PHQ9
2. FEELING DOWN, DEPRESSED OR HOPELESS: NOT AT ALL
SUM OF ALL RESPONSES TO PHQ QUESTIONS 1-9: 0
SUM OF ALL RESPONSES TO PHQ QUESTIONS 1-9: 0
1. LITTLE INTEREST OR PLEASURE IN DOING THINGS: NOT AT ALL
SUM OF ALL RESPONSES TO PHQ QUESTIONS 1-9: 0
SUM OF ALL RESPONSES TO PHQ QUESTIONS 1-9: 0
1. LITTLE INTEREST OR PLEASURE IN DOING THINGS: NOT AT ALL
2. FEELING DOWN, DEPRESSED OR HOPELESS: NOT AT ALL
SUM OF ALL RESPONSES TO PHQ9 QUESTIONS 1 & 2: 0

## 2025-04-24 ENCOUNTER — OFFICE VISIT (OUTPATIENT)
Dept: FAMILY MEDICINE CLINIC | Age: 35
End: 2025-04-24
Payer: COMMERCIAL

## 2025-04-24 VITALS
BODY MASS INDEX: 39.85 KG/M2 | SYSTOLIC BLOOD PRESSURE: 120 MMHG | WEIGHT: 239.2 LBS | DIASTOLIC BLOOD PRESSURE: 60 MMHG | OXYGEN SATURATION: 98 % | HEART RATE: 76 BPM | HEIGHT: 65 IN

## 2025-04-24 DIAGNOSIS — B00.1 COLD SORE: ICD-10-CM
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
## 2025-04-24 DIAGNOSIS — L98.9 SKIN LESION: ICD-10-CM

## 2025-04-24 DIAGNOSIS — L73.9 FOLLICULITIS: Primary | ICD-10-CM

## 2025-04-24 DIAGNOSIS — Z86.19 HISTORY OF SYPHILIS: ICD-10-CM

## 2025-04-24 DIAGNOSIS — S00.06XA TICK BITE OF SCALP, INITIAL ENCOUNTER: ICD-10-CM

## 2025-04-24 PROCEDURE — 99214 OFFICE O/P EST MOD 30 MIN: CPT | Performed by: NURSE PRACTITIONER

## 2025-04-24 RX ORDER — FAMCICLOVIR 250 MG/1
250 TABLET ORAL 2 TIMES DAILY
Qty: 60 TABLET | Refills: 3 | Status: SHIPPED | OUTPATIENT
Start: 2025-04-24

## 2025-04-24 RX ORDER — DOXYCYCLINE 100 MG/1
100 CAPSULE ORAL 2 TIMES DAILY
Qty: 20 CAPSULE | Refills: 0 | Status: SHIPPED | OUTPATIENT
Start: 2025-04-24 | End: 2025-05-04

## 2025-04-24 NOTE — PROGRESS NOTES
Patient: Kain Bailey is a 35 y.o. male who presents today with the following Chief Complaint(s):  Chief Complaint   Patient presents with    Other     Cut in pubic area and elbow, signs of infection         HPI  Lesions in pubic area-noticed one spot that has now crusted over about 3 days ago and then noticed the other 3 yesterday. States he had used a dollar tree version of rivera a few weeks ago. Has history of syphilis   Right elbow got scratched and then was in a hot tub on a cruise- he states after that noticed it was a little raised and red. Looks to be getting better now  Found a tick on back of scalp yesterday-not sure how long it was there but states his partner also had one the other day on him.     Takes famvir for cold sores     Current Outpatient Medications   Medication Sig Dispense Refill    doxycycline hyclate (VIBRAMYCIN) 100 MG capsule Take 1 capsule by mouth 2 times daily for 10 days 20 capsule 0    famciclovir (FAMVIR) 250 MG tablet Take 1 tablet by mouth 2 times daily 60 tablet 3     No current facility-administered medications for this visit.       Patient's past medical history, surgical history, family history, medications,  andallergies  were all reviewed and updated as appropriate today.      Review of Systems   Skin:         Red/pink lesion on right elbow- raised. 3 small red lesions in pubic area above the penis, one lesion that looks healed closer to the shaft of the penis         Physical Exam  Exam conducted with a chaperone present (Dr. Gan present for exam).   Constitutional:       Appearance: Normal appearance.   HENT:      Head: Normocephalic.   Eyes:      Conjunctiva/sclera: Conjunctivae normal.   Cardiovascular:      Rate and Rhythm: Regular rhythm.      Heart sounds: Normal heart sounds.   Pulmonary:      Effort: Pulmonary effort is normal.      Breath sounds: Normal breath sounds.   Skin:     General: Skin is warm and dry.      Comments: Red/pink lesion on right elbow-

## 2025-04-25 ENCOUNTER — TELEPHONE (OUTPATIENT)
Dept: FAMILY MEDICINE CLINIC | Age: 35
End: 2025-04-25

## 2025-04-25 LAB
REAGIN+T PALLIDUM IGG+IGM SERPL-IMP: REACTIVE
RPR SER QL: NORMAL

## 2025-04-25 NOTE — TELEPHONE ENCOUNTER
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 The first test on the syphillis says reactive but a confirmatory test has been sent and is still in process. No interventions needed until we have that final confirmatory test.

## 2025-04-29 ENCOUNTER — RESULTS FOLLOW-UP (OUTPATIENT)
Dept: FAMILY MEDICINE CLINIC | Age: 35
End: 2025-04-29

## 2025-04-29 LAB — T PALLIDUM AB SER QL AGGL: REACTIVE

## 2025-05-13 ENCOUNTER — TELEPHONE (OUTPATIENT)
Dept: FAMILY MEDICINE CLINIC | Age: 35
End: 2025-05-13

## 2025-05-13 NOTE — TELEPHONE ENCOUNTER
Pt called to ask if he is up to date with his immunizations and if he needs anything. Pt wants to check to see if he can schedule a tetanus or if he's all up to date. Pt requesting a call to let him know.

## 2025-05-13 NOTE — TELEPHONE ENCOUNTER
Pt called back to f/u on previous message. I advised him of provider response. He verbalized understanding and had no further questions.

## (undated) DEVICE — ENDO CARRY-ON PROCEDURE KIT INCLUDES SUCTION TUBING, LUBRICANT, GAUZE, BIOHAZARD STICKER, TRANSPORT PAD AND INTERCEPT BEDSIDE KIT.: Brand: ENDO CARRY-ON PROCEDURE KIT

## (undated) DEVICE — SNARE ENDOSCP AD L240CM LOOP W10MM SHTH DIA2.4MM RND INSUL

## (undated) DEVICE — ELECTRODE,RADIOTRANSLUCENT,FOAM,3PK: Brand: MEDLINE

## (undated) DEVICE — TRAP,MUCUS SPECIMEN, 80CC: Brand: MEDLINE